# Patient Record
Sex: FEMALE | Race: WHITE | NOT HISPANIC OR LATINO | URBAN - METROPOLITAN AREA
[De-identification: names, ages, dates, MRNs, and addresses within clinical notes are randomized per-mention and may not be internally consistent; named-entity substitution may affect disease eponyms.]

---

## 2017-01-09 ENCOUNTER — EMERGENCY (EMERGENCY)
Facility: HOSPITAL | Age: 32
LOS: 0 days | Discharge: HOME | End: 2017-01-09
Admitting: EMERGENCY MEDICINE
Payer: MEDICARE

## 2017-01-09 DIAGNOSIS — B37.3 CANDIDIASIS OF VULVA AND VAGINA: ICD-10-CM

## 2017-01-09 DIAGNOSIS — J06.9 ACUTE UPPER RESPIRATORY INFECTION, UNSPECIFIED: ICD-10-CM

## 2017-01-09 DIAGNOSIS — J01.90 ACUTE SINUSITIS, UNSPECIFIED: ICD-10-CM

## 2017-01-09 PROCEDURE — 99284 EMERGENCY DEPT VISIT MOD MDM: CPT

## 2017-03-17 ENCOUNTER — RECORD ABSTRACTING (OUTPATIENT)
Age: 32
End: 2017-03-17

## 2017-03-17 DIAGNOSIS — Z82.49 FAMILY HISTORY OF ISCHEMIC HEART DISEASE AND OTHER DISEASES OF THE CIRCULATORY SYSTEM: ICD-10-CM

## 2017-03-17 DIAGNOSIS — Z78.9 OTHER SPECIFIED HEALTH STATUS: ICD-10-CM

## 2017-03-17 DIAGNOSIS — Z83.3 FAMILY HISTORY OF DIABETES MELLITUS: ICD-10-CM

## 2017-03-17 DIAGNOSIS — D30.3 BENIGN NEOPLASM OF BLADDER: ICD-10-CM

## 2017-03-17 RX ORDER — ALBUTEROL SULFATE 90 UG/1
108 (90 BASE) AEROSOL, METERED RESPIRATORY (INHALATION)
Refills: 0 | Status: ACTIVE | COMMUNITY

## 2017-04-03 ENCOUNTER — RX RENEWAL (OUTPATIENT)
Age: 32
End: 2017-04-03

## 2017-04-04 ENCOUNTER — APPOINTMENT (OUTPATIENT)
Dept: INTERNAL MEDICINE | Facility: CLINIC | Age: 32
End: 2017-04-04

## 2019-01-10 ENCOUNTER — APPOINTMENT (OUTPATIENT)
Dept: NEUROLOGY | Facility: CLINIC | Age: 34
End: 2019-01-10

## 2019-08-06 ENCOUNTER — TRANSCRIPTION ENCOUNTER (OUTPATIENT)
Age: 34
End: 2019-08-06

## 2019-10-28 ENCOUNTER — TRANSCRIPTION ENCOUNTER (OUTPATIENT)
Age: 34
End: 2019-10-28

## 2019-11-16 ENCOUNTER — EMERGENCY (EMERGENCY)
Facility: HOSPITAL | Age: 34
LOS: 0 days | Discharge: HOME | End: 2019-11-17
Attending: EMERGENCY MEDICINE
Payer: MEDICARE

## 2019-11-16 VITALS — HEIGHT: 57 IN | WEIGHT: 229.94 LBS

## 2019-11-16 DIAGNOSIS — R11.10 VOMITING, UNSPECIFIED: ICD-10-CM

## 2019-11-16 DIAGNOSIS — Z98.890 OTHER SPECIFIED POSTPROCEDURAL STATES: ICD-10-CM

## 2019-11-16 DIAGNOSIS — R11.2 NAUSEA WITH VOMITING, UNSPECIFIED: ICD-10-CM

## 2019-11-16 DIAGNOSIS — R50.9 FEVER, UNSPECIFIED: ICD-10-CM

## 2019-11-16 DIAGNOSIS — R19.7 DIARRHEA, UNSPECIFIED: ICD-10-CM

## 2019-11-16 DIAGNOSIS — N89.8 OTHER SPECIFIED NONINFLAMMATORY DISORDERS OF VAGINA: ICD-10-CM

## 2019-11-16 LAB
ALBUMIN SERPL ELPH-MCNC: 3.5 G/DL — SIGNIFICANT CHANGE UP (ref 3.3–5)
ALP SERPL-CCNC: 86 U/L — SIGNIFICANT CHANGE UP (ref 40–120)
ALT FLD-CCNC: 28 U/L — SIGNIFICANT CHANGE UP (ref 12–78)
ANION GAP SERPL CALC-SCNC: 8 MMOL/L — SIGNIFICANT CHANGE UP (ref 5–17)
APPEARANCE UR: ABNORMAL
AST SERPL-CCNC: 11 U/L — LOW (ref 15–37)
BASOPHILS # BLD AUTO: 0.04 K/UL — SIGNIFICANT CHANGE UP (ref 0–0.2)
BASOPHILS NFR BLD AUTO: 0.3 % — SIGNIFICANT CHANGE UP (ref 0–2)
BILIRUB SERPL-MCNC: 0.5 MG/DL — SIGNIFICANT CHANGE UP (ref 0.2–1.2)
BILIRUB UR-MCNC: NEGATIVE — SIGNIFICANT CHANGE UP
BUN SERPL-MCNC: 19 MG/DL — SIGNIFICANT CHANGE UP (ref 7–23)
CALCIUM SERPL-MCNC: 8.9 MG/DL — SIGNIFICANT CHANGE UP (ref 8.5–10.1)
CHLORIDE SERPL-SCNC: 103 MMOL/L — SIGNIFICANT CHANGE UP (ref 96–108)
CO2 SERPL-SCNC: 25 MMOL/L — SIGNIFICANT CHANGE UP (ref 22–31)
COLOR SPEC: YELLOW — SIGNIFICANT CHANGE UP
CREAT SERPL-MCNC: 0.75 MG/DL — SIGNIFICANT CHANGE UP (ref 0.5–1.3)
DIFF PNL FLD: ABNORMAL
EOSINOPHIL # BLD AUTO: 0.07 K/UL — SIGNIFICANT CHANGE UP (ref 0–0.5)
EOSINOPHIL NFR BLD AUTO: 0.5 % — SIGNIFICANT CHANGE UP (ref 0–6)
GLUCOSE SERPL-MCNC: 114 MG/DL — HIGH (ref 70–99)
GLUCOSE UR QL: NEGATIVE MG/DL — SIGNIFICANT CHANGE UP
HCT VFR BLD CALC: 38.5 % — SIGNIFICANT CHANGE UP (ref 34.5–45)
HGB BLD-MCNC: 12.5 G/DL — SIGNIFICANT CHANGE UP (ref 11.5–15.5)
IMM GRANULOCYTES NFR BLD AUTO: 0.5 % — SIGNIFICANT CHANGE UP (ref 0–1.5)
KETONES UR-MCNC: NEGATIVE — SIGNIFICANT CHANGE UP
LACTATE SERPL-SCNC: 3.6 MMOL/L — HIGH (ref 0.7–2)
LEUKOCYTE ESTERASE UR-ACNC: ABNORMAL
LYMPHOCYTES # BLD AUTO: 1.04 K/UL — SIGNIFICANT CHANGE UP (ref 1–3.3)
LYMPHOCYTES # BLD AUTO: 7.9 % — LOW (ref 13–44)
MAGNESIUM SERPL-MCNC: 1.8 MG/DL — SIGNIFICANT CHANGE UP (ref 1.6–2.6)
MCHC RBC-ENTMCNC: 29.7 PG — SIGNIFICANT CHANGE UP (ref 27–34)
MCHC RBC-ENTMCNC: 32.5 GM/DL — SIGNIFICANT CHANGE UP (ref 32–36)
MCV RBC AUTO: 91.4 FL — SIGNIFICANT CHANGE UP (ref 80–100)
MONOCYTES # BLD AUTO: 0.71 K/UL — SIGNIFICANT CHANGE UP (ref 0–0.9)
MONOCYTES NFR BLD AUTO: 5.4 % — SIGNIFICANT CHANGE UP (ref 2–14)
NEUTROPHILS # BLD AUTO: 11.32 K/UL — HIGH (ref 1.8–7.4)
NEUTROPHILS NFR BLD AUTO: 85.4 % — HIGH (ref 43–77)
NITRITE UR-MCNC: NEGATIVE — SIGNIFICANT CHANGE UP
PH UR: 6 — SIGNIFICANT CHANGE UP (ref 5–8)
PHOSPHATE SERPL-MCNC: 1.8 MG/DL — LOW (ref 2.5–4.5)
PLATELET # BLD AUTO: 223 K/UL — SIGNIFICANT CHANGE UP (ref 150–400)
POTASSIUM SERPL-MCNC: 3.7 MMOL/L — SIGNIFICANT CHANGE UP (ref 3.5–5.3)
POTASSIUM SERPL-SCNC: 3.7 MMOL/L — SIGNIFICANT CHANGE UP (ref 3.5–5.3)
PROT SERPL-MCNC: 7.6 GM/DL — SIGNIFICANT CHANGE UP (ref 6–8.3)
PROT UR-MCNC: 30 MG/DL
RBC # BLD: 4.21 M/UL — SIGNIFICANT CHANGE UP (ref 3.8–5.2)
RBC # FLD: 13.3 % — SIGNIFICANT CHANGE UP (ref 10.3–14.5)
SODIUM SERPL-SCNC: 136 MMOL/L — SIGNIFICANT CHANGE UP (ref 135–145)
SP GR SPEC: 1.02 — SIGNIFICANT CHANGE UP (ref 1.01–1.02)
UROBILINOGEN FLD QL: NEGATIVE MG/DL — SIGNIFICANT CHANGE UP
WBC # BLD: 13.24 K/UL — HIGH (ref 3.8–10.5)
WBC # FLD AUTO: 13.24 K/UL — HIGH (ref 3.8–10.5)

## 2019-11-16 PROCEDURE — 96361 HYDRATE IV INFUSION ADD-ON: CPT

## 2019-11-16 PROCEDURE — 99284 EMERGENCY DEPT VISIT MOD MDM: CPT

## 2019-11-16 PROCEDURE — 96375 TX/PRO/DX INJ NEW DRUG ADDON: CPT

## 2019-11-16 PROCEDURE — 76830 TRANSVAGINAL US NON-OB: CPT

## 2019-11-16 PROCEDURE — 80053 COMPREHEN METABOLIC PANEL: CPT

## 2019-11-16 PROCEDURE — 81001 URINALYSIS AUTO W/SCOPE: CPT

## 2019-11-16 PROCEDURE — 84100 ASSAY OF PHOSPHORUS: CPT

## 2019-11-16 PROCEDURE — 87040 BLOOD CULTURE FOR BACTERIA: CPT | Mod: 91

## 2019-11-16 PROCEDURE — 36415 COLL VENOUS BLD VENIPUNCTURE: CPT

## 2019-11-16 PROCEDURE — 85025 COMPLETE CBC W/AUTO DIFF WBC: CPT

## 2019-11-16 PROCEDURE — 99284 EMERGENCY DEPT VISIT MOD MDM: CPT | Mod: 25

## 2019-11-16 PROCEDURE — 76830 TRANSVAGINAL US NON-OB: CPT | Mod: 26

## 2019-11-16 PROCEDURE — 96365 THER/PROPH/DIAG IV INF INIT: CPT

## 2019-11-16 PROCEDURE — 87086 URINE CULTURE/COLONY COUNT: CPT

## 2019-11-16 PROCEDURE — 83735 ASSAY OF MAGNESIUM: CPT

## 2019-11-16 PROCEDURE — 83605 ASSAY OF LACTIC ACID: CPT

## 2019-11-16 RX ORDER — SODIUM CHLORIDE 9 MG/ML
3200 INJECTION INTRAMUSCULAR; INTRAVENOUS; SUBCUTANEOUS ONCE
Refills: 0 | Status: DISCONTINUED | OUTPATIENT
Start: 2019-11-16 | End: 2019-11-16

## 2019-11-16 RX ORDER — SODIUM CHLORIDE 9 MG/ML
1000 INJECTION INTRAMUSCULAR; INTRAVENOUS; SUBCUTANEOUS ONCE
Refills: 0 | Status: COMPLETED | OUTPATIENT
Start: 2019-11-16 | End: 2019-11-16

## 2019-11-16 RX ORDER — SODIUM CHLORIDE 9 MG/ML
2250 INJECTION INTRAMUSCULAR; INTRAVENOUS; SUBCUTANEOUS ONCE
Refills: 0 | Status: COMPLETED | OUTPATIENT
Start: 2019-11-16 | End: 2019-11-16

## 2019-11-16 RX ORDER — ONDANSETRON 8 MG/1
4 TABLET, FILM COATED ORAL ONCE
Refills: 0 | Status: DISCONTINUED | OUTPATIENT
Start: 2019-11-16 | End: 2019-11-16

## 2019-11-16 RX ORDER — ONDANSETRON 8 MG/1
4 TABLET, FILM COATED ORAL ONCE
Refills: 0 | Status: COMPLETED | OUTPATIENT
Start: 2019-11-16 | End: 2019-11-16

## 2019-11-16 RX ORDER — PIPERACILLIN AND TAZOBACTAM 4; .5 G/20ML; G/20ML
3.38 INJECTION, POWDER, LYOPHILIZED, FOR SOLUTION INTRAVENOUS ONCE
Refills: 0 | Status: COMPLETED | OUTPATIENT
Start: 2019-11-16 | End: 2019-11-16

## 2019-11-16 RX ADMIN — SODIUM CHLORIDE 1125 MILLILITER(S): 9 INJECTION INTRAMUSCULAR; INTRAVENOUS; SUBCUTANEOUS at 22:01

## 2019-11-16 RX ADMIN — SODIUM CHLORIDE 1000 MILLILITER(S): 9 INJECTION INTRAMUSCULAR; INTRAVENOUS; SUBCUTANEOUS at 19:44

## 2019-11-16 RX ADMIN — PIPERACILLIN AND TAZOBACTAM 200 GRAM(S): 4; .5 INJECTION, POWDER, LYOPHILIZED, FOR SOLUTION INTRAVENOUS at 20:38

## 2019-11-16 RX ADMIN — SODIUM CHLORIDE 1000 MILLILITER(S): 9 INJECTION INTRAMUSCULAR; INTRAVENOUS; SUBCUTANEOUS at 22:02

## 2019-11-16 RX ADMIN — PIPERACILLIN AND TAZOBACTAM 3.38 GRAM(S): 4; .5 INJECTION, POWDER, LYOPHILIZED, FOR SOLUTION INTRAVENOUS at 22:02

## 2019-11-16 RX ADMIN — ONDANSETRON 4 MILLIGRAM(S): 8 TABLET, FILM COATED ORAL at 19:44

## 2019-11-16 NOTE — ED STATDOCS - OBJECTIVE STATEMENT
34 y/o female with PMHx of intracranial hypertension, bipolar I disorder, depression, extrinsic asthma, HLD, migraine headache, and UTI presents to the ED c/o +nausea and +vomiting today. Reports she had GYN procedure yesterday, removed a tampon that was stuck for ?weeks. Endorses +chills. No fever. NKDA. PCP: Dr. True Shah. 32 y/o female with PMHx of intracranial hypertension, bipolar I disorder, depression, extrinsic asthma, HLD, migraine headache, and UTI presents to the ED c/o +nausea and +vomiting today. Reports she had GYN procedure yesterday, removed a tampon that was stuck for ?weeks. Endorses +chills, +diarrhea, +vaginal bleeding/discharge . No fever. NKDA. PCP: Dr. True Shah. 32 y/o female with PMHx of intracranial hypertension, bipolar I disorder, depression, extrinsic asthma, HLD, migraine headache, and UTI presents to the ED c/o +nausea and +vomiting today. Reports she had GYN procedure yesterday, removed a tampon that was stuck for possibly 2 weeks.  Patient states she had vaginal discharge at time of exam, and cultures sent by her gynecologist.  She states she had yellow vaginal discharge frequently, and this is not new to her.  In addition to vomiting, she endorses +chills, +diarrhea, +vaginal bleeding/discharge this morning. No fever. NKDA. PCP: Dr. True Shah.

## 2019-11-16 NOTE — ED ADULT NURSE NOTE - OBJECTIVE STATEMENT
Patient comes in with nausea, vomiting, diarrhea since gyn procedure yesterday in which they removed a tampon. Patient states shes unable to keep fluids or solids down since procedure. Denies chest pain, sob.

## 2019-11-16 NOTE — ED STATDOCS - PMH
Benign intracranial hypertension    Bipolar I disorder    Depression    Extrinsic asthma    Hypercholesterolemia with endogenous hyperglyceridemia    Migraine headache    UTI (urinary tract infection)

## 2019-11-16 NOTE — ED STATDOCS - PATIENT PORTAL LINK FT
You can access the FollowMyHealth Patient Portal offered by [facility] by registering at the following website: [email or web address]. By joining [facility]’s FollowMyHealth portal, you will also be able to view your health information using other applications (apps) compatible with our system.

## 2019-11-16 NOTE — ED STATDOCS - PROGRESS NOTE DETAILS
Patient is a 32 y/o female with PMHx of intracranial hypertension, bipolar I disorder, depression, extrinsic asthma, HLD, migraine headache, and UTI who presents to [facility] c/o n/v x 1 day. Patient had a approx 2-3 week old tampon removed yesterday by her OB GYN. Patient feels feverish and has chills. DENIES vaginal bleeding, fever. ~LULA Pringle Awaiting SONO results, OB consultation. Care transitioned to LULA Granados. ~LULA Pringle Sono results with large ovarian cyst (already known to patient and her OB/Gyn, received outpatient MRI already), no torsion, no tubo-ovarian abscess.  Pelvic exam performed, scant yellow discharge, no bleeding, no cervical lesions, no retained products, no tenderness to cervix or adnexa.  Repeat urine collected and to be sent, repeat lactic acid to be drawn.  Newton Finch D.O.

## 2019-11-16 NOTE — ED ADULT TRIAGE NOTE - CHIEF COMPLAINT QUOTE
Pt s/p GYN procedure yesterday. Pt states that today she has nausea, vomiting and feels feverish (pt states that she does not have a thermometer at home).

## 2019-11-16 NOTE — ED STATDOCS - CLINICAL SUMMARY MEDICAL DECISION MAKING FREE TEXT BOX
Patient with CC of n/v/d on arrival.  Recent tampon removal yesterday.  Patient without hypotension, no rash thus does not fit toxic shock syndrome.  Pelvic exam with scan yellow vaginal discharge, however NO pelvic tenderness, which does not fit PID.  Also no tubo-ovarian abscess on US.  Large ovarian cyst, already known to patient and her OB/Gyn, and receiving outpatient follow up for this.  No signs of torsion on US.  Initial UA was contaminated.  Repeat UA demonstrates.  Pt with elevated Lactic acid and WBC, going along with infection/dehydration.  Repeat lactic acid drawn. Patient with CC of n/v/d on arrival.  Recent tampon removal yesterday.  Patient without hypotension, no rash thus does not fit toxic shock syndrome.  Pelvic exam with scan yellow vaginal discharge, however NO pelvic tenderness, which does not fit PID.  Also no tubo-ovarian abscess on US.  Large ovarian cyst, already known to patient and her OB/Gyn, and receiving outpatient follow up for this.  No signs of torsion on US.  Initial UA was contaminated.  Repeat UA normal.  Pt with elevated Lactic acid and WBC, going along with infection/dehydration.  Repeat lactic acid drawn. Patient with CC of n/v/d on arrival.  Recent tampon removal yesterday.  Patient without hypotension, no rash thus does not fit toxic shock syndrome.  Pelvic exam with scan yellow vaginal discharge, however NO pelvic tenderness, which does not fit PID.  Also no tubo-ovarian abscess on US.  Large ovarian cyst, already known to patient and her OB/Gyn, and receiving outpatient follow up for this.  No signs of torsion on US.  Initial UA was contaminated.  Repeat UA normal.  Pt with elevated Lactic acid and WBC, going along with infection/dehydration.  Repeat lactic acid drawn which was normal.  Low phosphorus, repleted in ED.  Pt feeling much improved, wants to go home.  Discussed treatment options of her vaginal discharge in ED, states gynecologist already took cultures, but is open to treating with antibiotics in light of her presentation tonight, and follow up with gynecology for this.  Also sending Zofran to pharmacy for n/v.  Strict return precautions given.

## 2019-11-16 NOTE — ED STATDOCS - PHYSICAL EXAMINATION
GEN: AOX3, NAD. HEENT: Throat clear. Airway is patent. EYES: PERRLA. EOMI. Head: NC/AT. NECK: Supple, No JVD. FROM. C-spine non-tender. CV:S1S2, RRR, LUNGS: CTA b/l, no w/r/r. CHEST: Equal chest expansion and rise. No deformity. ABD: Soft, NT/ND, no rebound, no guarding. No CVAT. EXT: No e/c/c. 2+ distal pulses. SKIN: No rashes. NEURO: No focal deficits. CN II-XII intact. FROM. 5/5 motor and sensory. LULA Pringle

## 2019-11-17 VITALS
HEART RATE: 100 BPM | RESPIRATION RATE: 17 BRPM | OXYGEN SATURATION: 100 % | TEMPERATURE: 100 F | DIASTOLIC BLOOD PRESSURE: 61 MMHG | SYSTOLIC BLOOD PRESSURE: 114 MMHG

## 2019-11-17 LAB
APPEARANCE UR: CLEAR — SIGNIFICANT CHANGE UP
BILIRUB UR-MCNC: NEGATIVE — SIGNIFICANT CHANGE UP
COLOR SPEC: YELLOW — SIGNIFICANT CHANGE UP
CULTURE RESULTS: SIGNIFICANT CHANGE UP
DIFF PNL FLD: ABNORMAL
GLUCOSE UR QL: NEGATIVE MG/DL — SIGNIFICANT CHANGE UP
KETONES UR-MCNC: NEGATIVE — SIGNIFICANT CHANGE UP
LEUKOCYTE ESTERASE UR-ACNC: ABNORMAL
NITRITE UR-MCNC: NEGATIVE — SIGNIFICANT CHANGE UP
PH UR: 6 — SIGNIFICANT CHANGE UP (ref 5–8)
PROT UR-MCNC: NEGATIVE MG/DL — SIGNIFICANT CHANGE UP
SP GR SPEC: 1.01 — SIGNIFICANT CHANGE UP (ref 1.01–1.02)
SPECIMEN SOURCE: SIGNIFICANT CHANGE UP
UROBILINOGEN FLD QL: NEGATIVE MG/DL — SIGNIFICANT CHANGE UP

## 2019-11-17 RX ORDER — METRONIDAZOLE 500 MG
1 TABLET ORAL
Qty: 14 | Refills: 0
Start: 2019-11-17 | End: 2019-11-23

## 2019-11-17 RX ORDER — ONDANSETRON 8 MG/1
1 TABLET, FILM COATED ORAL
Qty: 28 | Refills: 0
Start: 2019-11-17 | End: 2019-11-23

## 2019-11-17 RX ORDER — SODIUM,POTASSIUM PHOSPHATES 278-250MG
2 POWDER IN PACKET (EA) ORAL ONCE
Refills: 0 | Status: COMPLETED | OUTPATIENT
Start: 2019-11-17 | End: 2019-11-17

## 2019-11-17 RX ADMIN — Medication 2 PACKET(S): at 01:28

## 2019-11-18 LAB
CULTURE RESULTS: NO GROWTH — SIGNIFICANT CHANGE UP
SPECIMEN SOURCE: SIGNIFICANT CHANGE UP

## 2019-11-22 LAB
CULTURE RESULTS: SIGNIFICANT CHANGE UP
CULTURE RESULTS: SIGNIFICANT CHANGE UP
SPECIMEN SOURCE: SIGNIFICANT CHANGE UP
SPECIMEN SOURCE: SIGNIFICANT CHANGE UP

## 2019-12-27 PROBLEM — N39.0 URINARY TRACT INFECTION, SITE NOT SPECIFIED: Chronic | Status: ACTIVE | Noted: 2019-11-17

## 2019-12-27 PROBLEM — F31.9 BIPOLAR DISORDER, UNSPECIFIED: Chronic | Status: ACTIVE | Noted: 2019-11-17

## 2019-12-27 PROBLEM — G43.909 MIGRAINE, UNSPECIFIED, NOT INTRACTABLE, WITHOUT STATUS MIGRAINOSUS: Chronic | Status: ACTIVE | Noted: 2019-11-17

## 2019-12-27 PROBLEM — J45.909 UNSPECIFIED ASTHMA, UNCOMPLICATED: Chronic | Status: ACTIVE | Noted: 2019-11-17

## 2019-12-27 PROBLEM — F32.9 MAJOR DEPRESSIVE DISORDER, SINGLE EPISODE, UNSPECIFIED: Chronic | Status: ACTIVE | Noted: 2019-11-17

## 2019-12-27 PROBLEM — G93.2 BENIGN INTRACRANIAL HYPERTENSION: Chronic | Status: ACTIVE | Noted: 2019-11-17

## 2020-01-13 ENCOUNTER — APPOINTMENT (OUTPATIENT)
Dept: OBGYN | Facility: CLINIC | Age: 35
End: 2020-01-13

## 2020-02-15 ENCOUNTER — TRANSCRIPTION ENCOUNTER (OUTPATIENT)
Age: 35
End: 2020-02-15

## 2020-02-25 ENCOUNTER — APPOINTMENT (OUTPATIENT)
Dept: OBGYN | Facility: CLINIC | Age: 35
End: 2020-02-25

## 2020-05-14 ENCOUNTER — TRANSCRIPTION ENCOUNTER (OUTPATIENT)
Age: 35
End: 2020-05-14

## 2020-06-16 ENCOUNTER — APPOINTMENT (OUTPATIENT)
Dept: NEUROLOGY | Facility: CLINIC | Age: 35
End: 2020-06-16

## 2020-06-24 ENCOUNTER — APPOINTMENT (OUTPATIENT)
Dept: INTERNAL MEDICINE | Facility: CLINIC | Age: 35
End: 2020-06-24
Payer: MEDICARE

## 2020-06-24 VITALS
HEIGHT: 57 IN | SYSTOLIC BLOOD PRESSURE: 114 MMHG | OXYGEN SATURATION: 99 % | HEART RATE: 110 BPM | BODY MASS INDEX: 54.58 KG/M2 | TEMPERATURE: 98.7 F | DIASTOLIC BLOOD PRESSURE: 78 MMHG | WEIGHT: 253 LBS | RESPIRATION RATE: 16 BRPM

## 2020-06-24 PROCEDURE — 99204 OFFICE O/P NEW MOD 45 MIN: CPT

## 2020-06-24 RX ORDER — DIVALPROEX SODIUM 500 MG/1
500 TABLET, DELAYED RELEASE ORAL TWICE DAILY
Refills: 0 | Status: DISCONTINUED | COMMUNITY
End: 2020-06-24

## 2020-06-24 RX ORDER — FLUTICASONE PROPIONATE 50 UG/1
50 SPRAY, METERED NASAL
Refills: 0 | Status: ACTIVE | COMMUNITY

## 2020-06-24 RX ORDER — CELECOXIB 200 MG/1
200 CAPSULE ORAL TWICE DAILY
Refills: 0 | Status: DISCONTINUED | COMMUNITY
End: 2020-06-24

## 2020-06-24 RX ORDER — ACETAZOLAMIDE 250 MG/1
250 TABLET ORAL TWICE DAILY
Refills: 0 | Status: DISCONTINUED | COMMUNITY
End: 2020-06-24

## 2020-06-24 RX ORDER — FLUVOXAMINE MALEATE 50 MG/1
50 TABLET ORAL
Qty: 60 | Refills: 0 | Status: DISCONTINUED | COMMUNITY
Start: 2017-04-03 | End: 2020-06-24

## 2020-06-24 RX ORDER — SERTRALINE HYDROCHLORIDE 100 MG/1
100 TABLET, FILM COATED ORAL TWICE DAILY
Refills: 0 | Status: DISCONTINUED | COMMUNITY
End: 2020-06-24

## 2020-06-24 RX ORDER — SUMATRIPTAN 50 MG/1
TABLET, FILM COATED ORAL
Refills: 0 | Status: DISCONTINUED | COMMUNITY
End: 2020-06-24

## 2020-06-24 RX ORDER — LUBIPROSTONE 24 UG/1
24 CAPSULE, GELATIN COATED ORAL TWICE DAILY
Refills: 0 | Status: DISCONTINUED | COMMUNITY
End: 2020-06-24

## 2020-06-24 RX ORDER — ALBUTEROL SULFATE 2.5 MG/3ML
(2.5 MG/3ML) SOLUTION RESPIRATORY (INHALATION) EVERY 6 HOURS
Refills: 0 | Status: DISCONTINUED | COMMUNITY
End: 2020-06-24

## 2020-06-24 RX ORDER — AZELASTINE HYDROCHLORIDE 137 UG/1
137 SPRAY, METERED NASAL
Refills: 0 | Status: ACTIVE | COMMUNITY

## 2020-06-24 RX ORDER — CYCLOBENZAPRINE HYDROCHLORIDE 10 MG/1
10 TABLET, FILM COATED ORAL 3 TIMES DAILY
Qty: 90 | Refills: 0 | Status: DISCONTINUED | COMMUNITY
Start: 2017-04-03 | End: 2020-06-24

## 2020-06-24 RX ORDER — FLUVOXAMINE MALEATE 100 MG/1
100 TABLET, FILM COATED ORAL
Qty: 60 | Refills: 0 | Status: DISCONTINUED | COMMUNITY
Start: 2017-04-03 | End: 2020-06-24

## 2020-06-24 NOTE — HEALTH RISK ASSESSMENT
[Good] : ~his/her~  mood as  good [No] : No [1] : 1) Little interest or pleasure doing things for several days (1) [Patient reported colonoscopy was normal] : Patient reported colonoscopy was normal [Smoke Detector] : smoke detector [On disability] : on disability [Carbon Monoxide Detector] : carbon monoxide detector [Seat Belt] :  uses seat belt [Sunscreen] : uses sunscreen [] : No [[id number]] : 2 [Guns at Home] : no guns at home [ColonoscopyDate] : 01/2018

## 2020-06-24 NOTE — HISTORY OF PRESENT ILLNESS
[FreeTextEntry1] : Patient comes in to establish care.\par  [de-identified] : Patient is a 34-year-old female with a history of anxiety and depression, panic attacks, OCD, IBS with constipation, hyperlipidemia, GERD, back pain with sciatica, migraine headaches, intracranial hypertension, asthma comes in to establish care. Previously was followed with Dr. Sweta Wasserman for her primary care. Currently sees  GI, Dr.Donna Pichardo GYN, Dr.Hourizideh Diaz,  NeuroOptho, Psych Dr.Bizwada,  Rheumatology. Patient states she has a long history of back pain with degenerative disc disease for which he is on disability for her. [place] and will need another disability form filled out. She is also on disability 2 history of multiple psychiatric disorders. She states she is unable to lose weight or control her diet and she craves sugar and is unable to stop drinking sodas, coffee with sugar, and has an uncontrolled diet. Her cholesterol continues to remain high and her previous PCP was checking every 3 months. She is compliant with her Lipitor 20 mg. Her psychiatrist and therapist have recommended losing weight but she has been unable to. She is also been referred to plastic surgeon for breast reduction however insurance states she will need to try and fail conventional methods of weight loss prior to being able to have a breast reduction.\par Patient denies any cp, sob,abdominal pain, nausea, vomiting, palpitations, fever, chills, constipation, diarrhea.\par

## 2020-06-24 NOTE — HISTORY OF PRESENT ILLNESS
[FreeTextEntry1] : Patient comes in to establish care.\par  [de-identified] : Patient is a 34-year-old female with a history of anxiety and depression, panic attacks, OCD, IBS with constipation, hyperlipidemia, GERD, back pain with sciatica, migraine headaches, intracranial hypertension, asthma comes in to establish care. Previously was followed with Dr. Sweta Wasserman for her primary care. Currently sees  GI, Dr.Donna Pichardo GYN, Dr.Hourizideh Diaz,  NeuroOptho, Psych Dr.Bizwada,  Rheumatology. Patient states she has a long history of back pain with degenerative disc disease for which he is on disability for her. [place] and will need another disability form filled out. She is also on disability 2 history of multiple psychiatric disorders. She states she is unable to lose weight or control her diet and she craves sugar and is unable to stop drinking sodas, coffee with sugar, and has an uncontrolled diet. Her cholesterol continues to remain high and her previous PCP was checking every 3 months. She is compliant with her Lipitor 20 mg. Her psychiatrist and therapist have recommended losing weight but she has been unable to. She is also been referred to plastic surgeon for breast reduction however insurance states she will need to try and fail conventional methods of weight loss prior to being able to have a breast reduction.\par Patient denies any cp, sob,abdominal pain, nausea, vomiting, palpitations, fever, chills, constipation, diarrhea.\par

## 2020-06-24 NOTE — ASSESSMENT
[FreeTextEntry1] : 1.hyperlipidemia: Recheck nonfasting lipids, discussed trying low-cholesterol diet, continue on Lipitor 20 mg daily. Follow up in 6 months\par \par 2.depression/OCD/anxiety with panic attacks: Continue followup with psychiatrist and therapist, continue on Lexapro 10 mg, Prozac 40 mg, Ativan 1 mg and Risperdal 2 mg\par \par 3.asthma: Continued well the pulmonologist, continue on Ventolin and Advair.\par \par 4.GERD with IBS: Continue on Linzess  and Protonix, follow up with GI.\par \par 5.benign intracranial hypertension: Follow up with neuro-ophthalmology.

## 2020-06-24 NOTE — HEALTH RISK ASSESSMENT
[Good] : ~his/her~  mood as  good [No] : In the past 12 months have you used drugs other than those required for medical reasons? No [1] : 1) Little interest or pleasure doing things for several days (1) [Patient reported colonoscopy was normal] : Patient reported colonoscopy was normal [On disability] : on disability [Smoke Detector] : smoke detector [Carbon Monoxide Detector] : carbon monoxide detector [Seat Belt] :  uses seat belt [Sunscreen] : uses sunscreen [[id number]] : 2 [] : No [ColonoscopyDate] : 01/2018 [Guns at Home] : no guns at home

## 2020-06-24 NOTE — PAST MEDICAL HISTORY
[Menarche Age ____] : age at menarche was [unfilled] [Irregular Cycle Intervals] : are  irregular [Definite ___ (Date)] : the last menstrual period was [unfilled]

## 2020-06-29 LAB
ALBUMIN SERPL ELPH-MCNC: 4.4 G/DL
ALP BLD-CCNC: 76 U/L
ALT SERPL-CCNC: 15 U/L
ANION GAP SERPL CALC-SCNC: 13 MMOL/L
AST SERPL-CCNC: 14 U/L
BASOPHILS # BLD AUTO: 0.07 K/UL
BASOPHILS NFR BLD AUTO: 0.7 %
BILIRUB SERPL-MCNC: 0.3 MG/DL
BUN SERPL-MCNC: 9 MG/DL
CALCIUM SERPL-MCNC: 9.4 MG/DL
CHLORIDE SERPL-SCNC: 100 MMOL/L
CHOLEST SERPL-MCNC: 221 MG/DL
CHOLEST/HDLC SERPL: 5.1 RATIO
CO2 SERPL-SCNC: 26 MMOL/L
CREAT SERPL-MCNC: 0.54 MG/DL
EOSINOPHIL # BLD AUTO: 0.08 K/UL
EOSINOPHIL NFR BLD AUTO: 0.8 %
ESTIMATED AVERAGE GLUCOSE: 108 MG/DL
GLUCOSE SERPL-MCNC: 97 MG/DL
HBA1C MFR BLD HPLC: 5.4 %
HCT VFR BLD CALC: 38.1 %
HDLC SERPL-MCNC: 43 MG/DL
HGB BLD-MCNC: 12 G/DL
IMM GRANULOCYTES NFR BLD AUTO: 0.5 %
LDLC SERPL CALC-MCNC: 155 MG/DL
LYMPHOCYTES # BLD AUTO: 2.58 K/UL
LYMPHOCYTES NFR BLD AUTO: 26.5 %
MAN DIFF?: NORMAL
MCHC RBC-ENTMCNC: 29.1 PG
MCHC RBC-ENTMCNC: 31.5 GM/DL
MCV RBC AUTO: 92.5 FL
MONOCYTES # BLD AUTO: 0.59 K/UL
MONOCYTES NFR BLD AUTO: 6.1 %
NEUTROPHILS # BLD AUTO: 6.37 K/UL
NEUTROPHILS NFR BLD AUTO: 65.4 %
PLATELET # BLD AUTO: 252 K/UL
POTASSIUM SERPL-SCNC: 3.9 MMOL/L
PROT SERPL-MCNC: 7.2 G/DL
RBC # BLD: 4.12 M/UL
RBC # FLD: 13.8 %
SODIUM SERPL-SCNC: 138 MMOL/L
TRIGL SERPL-MCNC: 112 MG/DL
TSH SERPL-ACNC: 1.96 UIU/ML
WBC # FLD AUTO: 9.74 K/UL

## 2020-07-22 ENCOUNTER — TRANSCRIPTION ENCOUNTER (OUTPATIENT)
Age: 35
End: 2020-07-22

## 2020-08-03 ENCOUNTER — APPOINTMENT (OUTPATIENT)
Dept: ORTHOPEDIC SURGERY | Facility: CLINIC | Age: 35
End: 2020-08-03
Payer: MEDICARE

## 2020-08-03 DIAGNOSIS — S69.82XD OTHER SPECIFIED INJURIES OF LEFT WRIST, HAND AND FINGER(S), SUBSEQUENT ENCOUNTER: ICD-10-CM

## 2020-08-03 PROCEDURE — 99204 OFFICE O/P NEW MOD 45 MIN: CPT

## 2020-08-03 NOTE — PHYSICAL EXAM
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 No ecchymosis. No swelling. No palpable joint effusion. No palpable deformity. No crepitus\par ROM: RIGHT full flexion and extension, full supination/pronation.  LEFT the flexion and extension are intact but limited secondary to pain, supination/pronation are intact but limited secondary to pain.\par Painful ROM: None\par Tenderness: No tenderness to palpation at the distal radius, distal ulna, the DRUJ. No pain at the scapholunate interval. No snuffbox pain. Positive pain at the ulnar fovea.\par Strength: 5/5 wrist flexion, 5/5 wrist extension, 5/5 supination, 5/5 pronation\par Stability: Stable DRUJ \par Vasc: 2+ radial pulse, <2s cap refill\par Sensation: In tact to light touch throughout\par Neuro: Negative tinels over median nerve, AIN/PIN/Ulnar nerve in tact to motor/sensation.\par  [de-identified] : 3 x-ray views of the left wrist were reviewed from an outside institution. There are no fractures or dislocations noted. There is mild sclerosis noted consistent with possible Keinbock's with ulnar negative variance.

## 2020-08-03 NOTE — HISTORY OF PRESENT ILLNESS
[FreeTextEntry1] : 08/03/2020: Patient is a 34 year-old, right-hand-dominant female who presents to the office today with left ulnar-sided wrist pain.  She notes that about 2 weeks ago she had closed a shower door on her left wrist. She felt pain right away and was seen at urgent care. No fractures were noted. Since then she has been wearing a wrist immobilizer. She has had difficulty opening jars or performing twisting motions. She has taken Tylenol or Aleve with no improvement. She denies paresthesias.

## 2020-08-03 NOTE — ASSESSMENT
[FreeTextEntry1] : 34-year-old female with left ulnar sided wrist pain following an injury. Symptoms are consistent with a sprain of the TFCC, I recommend continued conservative treatment. She will take a 2 week course of anti-inflammatory medication and continue to use the wrist immobilizer. Follow up in 2 weeks for reevaluation. The nature of Kienboch's disease were discussed at length with the patient I recommend observation at this time since she remains asymptomatic.

## 2020-08-03 NOTE — PHYSICAL EXAM
[Normal Finger/nose] : finger to nose coordination [Normal] : no peripheral adenopathy appreciated [de-identified] : Left wrist exam\par \par Skin: Clean, dry, intact. No ecchymosis. No swelling. No palpable joint effusion. No palpable deformity. No crepitus\par ROM: RIGHT full flexion and extension, full supination/pronation.  LEFT the flexion and extension are intact but limited secondary to pain, supination/pronation are intact but limited secondary to pain.\par Painful ROM: None\par Tenderness: No tenderness to palpation at the distal radius, distal ulna, the DRUJ. No pain at the scapholunate interval. No snuffbox pain. Positive pain at the ulnar fovea.\par Strength: 5/5 wrist flexion, 5/5 wrist extension, 5/5 supination, 5/5 pronation\par Stability: Stable DRUJ \par Vasc: 2+ radial pulse, <2s cap refill\par Sensation: In tact to light touch throughout\par Neuro: Negative tinels over median nerve, AIN/PIN/Ulnar nerve in tact to motor/sensation.\par  [de-identified] : MAYOR [de-identified] : 3 x-ray views of the left wrist were reviewed from an outside institution. There are no fractures or dislocations noted. There is mild sclerosis noted consistent with possible Keinbock's with ulnar negative variance.

## 2020-08-07 ENCOUNTER — APPOINTMENT (OUTPATIENT)
Dept: NEUROLOGY | Facility: CLINIC | Age: 35
End: 2020-08-07
Payer: MEDICARE

## 2020-08-07 VITALS
DIASTOLIC BLOOD PRESSURE: 70 MMHG | BODY MASS INDEX: 55.02 KG/M2 | HEIGHT: 57 IN | SYSTOLIC BLOOD PRESSURE: 110 MMHG | TEMPERATURE: 97.9 F | HEART RATE: 109 BPM | WEIGHT: 255 LBS

## 2020-08-07 DIAGNOSIS — R42 DIZZINESS AND GIDDINESS: ICD-10-CM

## 2020-08-07 PROCEDURE — 99204 OFFICE O/P NEW MOD 45 MIN: CPT

## 2020-08-07 NOTE — HISTORY OF PRESENT ILLNESS
[FreeTextEntry1] : \par 4-year-old woman with a history of benign intracranial hypertension, chronic headaches, chronic back pain, fibromyalgia, mood disorder. [employer], complains of almost daily headaches, described as bilateral, throbbing, moderate to severe, immediately on awakening, or when she was asleep. This to worsen with activity or stress, can be so severe she could has to lay down. Associated with lightheadedness, light and sensitivity, blurred vision. Headaches can last 2-4 hours, sometimes more.Occasionally nauseous and rarely vomits. Takes Imitrex 25 mg, which helps. Also uses Excedrin, and Tylenol with some relief. As try preventative therapy the past, topiramate, presently O. 75 mg a day.\par Has been complaining of increasing frequency, severity. Now, more recently, complaining of intermittent vertigo, lightheadedness, steady gait, usually worsened with body movement, and activity. No change in hearing, no ringing in the has, no ear pain or pressure.\par History of chronic back pain, recent bleed. Reports had an MRI of the thoracic spine, clavicle last vertebra T7. Complains of no pain in both legs, sometimes radiated down the thighs, tingling, and numbness about the leg. No change in bowel or bladder habits.No history of malignancy, no diabetes.

## 2020-08-07 NOTE — PHYSICAL EXAM
[General Appearance - Alert] : alert [General Appearance - In No Acute Distress] : in no acute distress [Oriented To Time, Place, And Person] : oriented to person, place, and time [Impaired Insight] : insight and judgment were intact [Affect] : the affect was normal [Place] : oriented to place [Person] : oriented to person [Concentration Intact] : normal concentrating ability [Visual Intact] : visual attention was ~T not ~L decreased [Time] : oriented to time [Naming Objects] : no difficulty naming common objects [Repeating Phrases] : no difficulty repeating a phrase [Fluency] : fluency intact [Comprehension] : comprehension intact [Writing A Sentence] : no difficulty writing a sentence [Past History] : adequate knowledge of personal past history [Reading] : reading intact [Cranial Nerves Optic (II)] : visual acuity intact bilaterally,  visual fields full to confrontation, pupils equal round and reactive to light [Cranial Nerves Vestibulocochlear (VIII)] : hearing was intact bilaterally [Cranial Nerves Oculomotor (III)] : extraocular motion intact [Cranial Nerves Facial (VII)] : face symmetrical [Cranial Nerves Trigeminal (V)] : facial sensation intact symmetrically [Cranial Nerves Accessory (XI - Cranial And Spinal)] : head turning and shoulder shrug symmetric [Cranial Nerves Glossopharyngeal (IX)] : tongue and palate midline [No Muscle Atrophy] : normal bulk in all four extremities [Motor Tone] : muscle tone was normal in all four extremities [Cranial Nerves Hypoglossal (XII)] : there was no tongue deviation with protrusion [Motor Strength Upper Extremities Bilaterally] : strength was normal in both upper extremities [Paresis Pronator Drift Left-Sided] : no pronator drift on the left [Paresis Pronator Drift Right-Sided] : no pronator drift on the right [Motor Handedness Right-Handed] : the patient is right hand dominant [Motor Strength Upper Extremities Left] : strength was normal in the left upper extremity [Motor Strength Upper Extremities Right] : strength was normal in the right upper extremity [Motor Strength Lower Extremities Bilaterally] : there was weakness in both lower extremities [Sensation Pain / Temperature Decrease] : pain and temperature was intact [Motor Strength Lower Extremities Left] : there was weakness of the left lower extremity [Motor Strength Lower Extremities Right] : there was weakness of the right lower extremity [Sensation Tactile Decrease] : light touch was intact [Proprioception] : proprioception was intact [Romberg's Sign] : Romberg's sign was negtive [Abnormal Walk] : normal gait [Balance] : balance was intact [Tremor] : no tremor present [Past-pointing] : there was no past-pointing [Dysdiadochokinesia Bilaterally] : not present [Coordination - Dysmetria Impaired Finger-to-Nose Bilateral] : not present [2+] : Ankle jerk right 2+ [Plantar Reflex Right Only] : normal on the right [Plantar Reflex Left Only] : normal on the left [Sclera] : the sclera and conjunctiva were normal [PERRL With Normal Accommodation] : pupils were equal in size, round, reactive to light, with normal accommodation [Extraocular Movements] : extraocular movements were intact [Optic Disc Abnormality] : the optic disc were normal in size and color [Full Visual Field] : full visual field [Outer Ear] : the ears and nose were normal in appearance [Hearing Threshold Finger Rub Not [place]] : hearing was normal [Neck Appearance] : the appearance of the neck was normal [] : no respiratory distress [Neck Cervical Mass (___cm)] : no neck mass was observed [Respiration, Rhythm And Depth] : normal respiratory rhythm and effort [Arterial Pulses Carotid] : carotid pulses were normal with no bruits [FreeTextEntry1] : diffuse tenderness of the spine from midthoracic to lower lumbar [Edema] : there was no peripheral edema [Full Pulse] : the pedal pulses are present

## 2020-08-07 NOTE — REVIEW OF SYSTEMS
[Feeling Tired] : feeling tired [Numbness] : numbness [Tingling] : tingling [Difficulty Walking] : difficulty walking [Tension Headache] : tension-type headaches [Migraine Headache] : migraine headaches [Abnormal Sensation] : an abnormal sensation [Sleep Disturbances] : sleep disturbances [Depression] : depression [Anxiety] : anxiety [As Noted in HPI] : as noted in HPI [Joint Stiffness] : joint stiffness [Limb Pain] : limb pain [Negative] : Endocrine [FreeTextEntry9] : Chronic back pain

## 2020-08-07 NOTE — DISCUSSION/SUMMARY
[FreeTextEntry1] : 34-year-old woman with a history of chronic daily headaches, diagnosed in the past with intracranial hypotension, due to the topiramate. No responses for the headaches, more recently, complaining of persistent vertigo. Lower back pain, numbness and tingling in the extremities.\par Like to rule out any intercurrent pathology, and other disorder. Rule out MS, rule out peripheral neuropathy.\par Plan: MRI of brain with and without gadolinium.\par electroencephalograph.\par Electromyography of the lower extremities.\par Referral to ENT\par  Trial with Ajovy 225 mg SQ RUE, After obtaining verbal Consent.\par Imitrex 100 mg po PRN, can repeat in 2 hours

## 2020-08-24 RX ORDER — FREMANEZUMAB-VFRM 225 MG/1.5ML
225 INJECTION SUBCUTANEOUS
Qty: 1 | Refills: 11 | Status: DISCONTINUED | COMMUNITY
Start: 2020-08-20 | End: 2020-08-24

## 2020-09-01 ENCOUNTER — APPOINTMENT (OUTPATIENT)
Dept: ORTHOPEDIC SURGERY | Facility: CLINIC | Age: 35
End: 2020-09-01

## 2020-09-11 ENCOUNTER — APPOINTMENT (OUTPATIENT)
Dept: INTERNAL MEDICINE | Facility: CLINIC | Age: 35
End: 2020-09-11
Payer: MEDICARE

## 2020-09-11 VITALS
DIASTOLIC BLOOD PRESSURE: 50 MMHG | SYSTOLIC BLOOD PRESSURE: 118 MMHG | RESPIRATION RATE: 16 BRPM | BODY MASS INDEX: 56.74 KG/M2 | OXYGEN SATURATION: 99 % | WEIGHT: 263 LBS | HEART RATE: 90 BPM | TEMPERATURE: 98.8 F | HEIGHT: 57 IN

## 2020-09-11 DIAGNOSIS — D23.5 OTHER BENIGN NEOPLASM OF SKIN OF TRUNK: ICD-10-CM

## 2020-09-11 PROCEDURE — 99214 OFFICE O/P EST MOD 30 MIN: CPT

## 2020-09-11 NOTE — HISTORY OF PRESENT ILLNESS
[FreeTextEntry8] : Ms. TERRI VALLECILLO is a 34 year F who comes in for an acute visit.\par Patient is a 34-year-old female with history of anxiety and depression, OCD, hyperlipidemia, asthma comes in to discuss her upcoming bariatric surgery. She is seeing  who told her that she would need to see her primary care physician for clearance, cardiology, pulmonary, GI and psychology and nutritionist. Most of the nutritionist she is spoken to do not take her insurance and she is having a hard time finding a psychologist for the same reason. She's currently seeing psychiatrist Ana Maria Sanchez and  for therapy. She has a stress test with her cardiologist today Dr. Stefan Miles. She also has been seeing her dermatologist recently Dr. Lizbeth Moreno for a right breast possible dermatofibroma. Recently the dermatofibroma lesion on her right breast started to leak and therefore she was referred to a surgeon at [facility] for extension. However, they do not take her insurance and she needs a breast surgeon as well.\par I discussed with the patient up for her bariatric surgery she would likely need all of her other subspecialty clearance as and to have her medical clearance and within 30 days prior to her procedure and that I would need to have a letter from bariatric surgeon regarding lab work and EKG is needed prior to her procedure or presurgical testing.\par I also did let her know that if there is no physician within [facility] that she would need to contact her insurance plans.

## 2020-09-11 NOTE — ASSESSMENT
[FreeTextEntry1] : 1.morbid obesity: Advised patient that she would need to follow up with her other consultants including cardiology, pulmonary, GI and psychology and nutritionist for her upcoming bariatric surgery and to see me 30 days prior to her procedure with presurgical testing information from surgeon, .\par Patient given [facility] information for referrals needed including psychology, nutritionist and breast surgeon. She understands that they did not take her insurance that she will need to be in touch with her insurance company.\par \par 2.dermatofibroma of the right breast: Given referral to breast surgeon for possible excision. Follow up with dermatology.\par \par

## 2020-09-18 ENCOUNTER — APPOINTMENT (OUTPATIENT)
Dept: NEUROLOGY | Facility: CLINIC | Age: 35
End: 2020-09-18

## 2020-10-05 ENCOUNTER — APPOINTMENT (OUTPATIENT)
Dept: NEUROLOGY | Facility: CLINIC | Age: 35
End: 2020-10-05

## 2020-10-07 ENCOUNTER — APPOINTMENT (OUTPATIENT)
Dept: NEUROLOGY | Facility: CLINIC | Age: 35
End: 2020-10-07

## 2020-11-06 ENCOUNTER — APPOINTMENT (OUTPATIENT)
Dept: DERMATOLOGY | Facility: CLINIC | Age: 35
End: 2020-11-06

## 2020-12-07 ENCOUNTER — APPOINTMENT (OUTPATIENT)
Dept: NEUROLOGY | Facility: CLINIC | Age: 35
End: 2020-12-07
Payer: MEDICARE

## 2020-12-07 VITALS
WEIGHT: 263 LBS | BODY MASS INDEX: 56.74 KG/M2 | HEIGHT: 57 IN | HEART RATE: 90 BPM | SYSTOLIC BLOOD PRESSURE: 118 MMHG | DIASTOLIC BLOOD PRESSURE: 70 MMHG

## 2020-12-07 PROCEDURE — 99213 OFFICE O/P EST LOW 20 MIN: CPT

## 2020-12-07 NOTE — DISCUSSION/SUMMARY
[FreeTextEntry1] : 34 or one history of chronic headaches, migraines, doing very well on Aimovig. No changes at this time.\par Complaint of chronic back pain, numbness of the feet, at cancel her previous appointment for electromyography and nerve conduction study of the lower extremities.\par Plan advised to reschedule.

## 2020-12-07 NOTE — HISTORY OF PRESENT ILLNESS
[FreeTextEntry1] : 44-year-old woman complaining of chronic headaches, previously prescribed Aimovig referred to results, significant reduction in her headache frequency and severity. The medication is well tolerated.\par complaining of chronic back pain, previous studies including MRI of the lumbar sacral spine demonstrated multilevel degenerative changes. Complains of numbness of the legs, especially the feet. Seeing a podiatrist.Occasionally as noted to have a letter to falls,  so far without any injuries. No bowel or bladder dysfunction. No history of diabetes or Lyme disease.

## 2020-12-07 NOTE — REVIEW OF SYSTEMS
[As Noted in HPI] : as noted in HPI [Numbness] : numbness [Tingling] : tingling [Frequent Falls] : frequent falls [Negative] : Heme/Lymph [FreeTextEntry9] : chronic back pain

## 2020-12-07 NOTE — PHYSICAL EXAM
[General Appearance - Alert] : alert [General Appearance - In No Acute Distress] : in no acute distress [Person] : oriented to person [Place] : oriented to place [Time] : oriented to time [Fluency] : fluency intact [Comprehension] : comprehension intact [Past History] : adequate knowledge of personal past history [Cranial Nerves Optic (II)] : visual acuity intact bilaterally,  visual fields full to confrontation, pupils equal round and reactive to light [Cranial Nerves Oculomotor (III)] : extraocular motion intact [Cranial Nerves Trigeminal (V)] : facial sensation intact symmetrically [Cranial Nerves Facial (VII)] : face symmetrical [Cranial Nerves Vestibulocochlear (VIII)] : hearing was intact bilaterally [Cranial Nerves Glossopharyngeal (IX)] : tongue and palate midline [Cranial Nerves Accessory (XI - Cranial And Spinal)] : head turning and shoulder shrug symmetric [Cranial Nerves Hypoglossal (XII)] : there was no tongue deviation with protrusion [Motor Tone] : muscle tone was normal in all four extremities [No Muscle Atrophy] : normal bulk in all four extremities [Motor Handedness Right-Handed] : the patient is right hand dominant [Paresis Pronator Drift Right-Sided] : no pronator drift on the right [Paresis Pronator Drift Left-Sided] : no pronator drift on the left [Motor Strength Upper Extremities Bilaterally] : strength was normal in both upper extremities [Motor Strength Upper Extremities Right] : strength was normal in the right upper extremity [Motor Strength Upper Extremities Left] : strength was normal in the left upper extremity [Motor Strength Lower Extremities Bilaterally] : there was weakness in both lower extremities [Motor Strength Lower Extremities Right] : there was weakness of the right lower extremity [Motor Strength Lower Extremities Left] : there was weakness of the left lower extremity [Sensation Tactile Decrease] : light touch was intact [Sensation Pain / Temperature Decrease] : pain and temperature was intact [Proprioception] : proprioception was intact [Romberg's Sign] : Romberg's sign was negtive [Abnormal Walk] : normal gait [Balance] : balance was intact [Past-pointing] : there was no past-pointing [Tremor] : no tremor present [Dysdiadochokinesia Bilaterally] : not present [Coordination - Dysmetria Impaired Finger-to-Nose Bilateral] : not present [2+] : Ankle jerk left 2+ [Plantar Reflex Right Only] : normal on the right [Plantar Reflex Left Only] : normal on the left

## 2020-12-08 ENCOUNTER — APPOINTMENT (OUTPATIENT)
Dept: ORTHOPEDIC SURGERY | Facility: CLINIC | Age: 35
End: 2020-12-08
Payer: MEDICARE

## 2020-12-08 DIAGNOSIS — R20.2 PARESTHESIA OF SKIN: ICD-10-CM

## 2020-12-08 PROCEDURE — 99213 OFFICE O/P EST LOW 20 MIN: CPT

## 2020-12-08 NOTE — HISTORY OF PRESENT ILLNESS
[FreeTextEntry1] : 34-year-old female presents for followup of left wrist pain as well as with a new complaint of increasing paresthesias in her left hand. She denies new trauma or and ligament. She experiences paresthesias in all digits of her left handon a daily basis and occasionally the paresthesias occur at night. She has used a wrist immobilizer which has not provided significant relief.

## 2020-12-08 NOTE — PHYSICAL EXAM
[de-identified] : Physical exam shows the patient to be alert and oriented x3, capable of ambulation. The patient is well-developed and well-nourished in no apparent respiratory distress. Majority of the skin is intact bilaterally in the upper extremities without lymphadenopathy at the elbows.\par \par There is a negative Spurling test. There is no sensitivity or Tinel's over the axilla bilaterally in the area of the brachial plexus.\par \par The elbows have a symmetric and full range of motion with no pain upon forced flexion or extension bilaterally. There is no tenderness over the medial or lateral epicondyles bilaterally. The biceps and triceps are intact bilaterally with 5 over 5 strength. There is no joint effusion or instability of the medial and lateral collateral ligament complex bilaterally. There is no sensitivity of the median ulnar or radial nerves with provocative tests bilaterally.\par \par The wrists have a symmetric range of motion bilaterally. There is no tenderness over the scaphoid, scapholunate or lunotriquetral ligaments bilaterally. There is a negative Damon test bilaterally. There is negative tenderness over the radial ulnar joint or TFCC and no evidence of instability bilaterally. No tenderness over the pisotriquetral or hamate hook or CMC joint bilaterally. There is 5 over 5 strength of the wrists bilaterally.\par \par There is a negative Finkelstein test bilaterally and no tenderness over the A1 pulleys. There is no tenderness or instability of the MP PIP or DIP joints in the digits bilaterally with no evidence of digital malrotation.\par \par There is no sensitivity or masses around the ulnar nerve at the level of the wrist bilaterally.\par \par \par There is 2+ pulses over the radial arteries bilaterally with good capillary refill.\par \par There is a positive Tinel's and a positive Phalen's test at 15 seconds on the left. There is no thenar atrophy, good opposition is present. The remaining intrinsic musculature has good strength bilaterally.\par \par \par

## 2020-12-08 NOTE — ASSESSMENT
[FreeTextEntry1] : 34-year-old female with progressive paresthesias of the left hand. Recommend EMG for further workup and use of a wrist immobilizer for nighttime symptoms. She will followup to review the results of EMG and discuss further treatment options.

## 2020-12-16 ENCOUNTER — APPOINTMENT (OUTPATIENT)
Dept: INTERNAL MEDICINE | Facility: CLINIC | Age: 35
End: 2020-12-16
Payer: MEDICARE

## 2020-12-16 VITALS
OXYGEN SATURATION: 99 % | SYSTOLIC BLOOD PRESSURE: 130 MMHG | WEIGHT: 268 LBS | TEMPERATURE: 98.1 F | DIASTOLIC BLOOD PRESSURE: 80 MMHG | HEART RATE: 110 BPM | HEIGHT: 57 IN | RESPIRATION RATE: 22 BRPM | BODY MASS INDEX: 57.82 KG/M2

## 2020-12-16 PROCEDURE — G0438: CPT

## 2020-12-16 NOTE — ASSESSMENT
[FreeTextEntry1] : 1.health maintenance: DMV forms returned to the patient today. We'll mail a copy of her annual exam to patient. Discussed fasting blood work to get. She does not wish to have influenza or pneumonia vaccine today. Up-to-date with other vaccines.\par \par 2.increased breast size: She will follow up with breast surgery to see what she can schedule surgery and return for medical clearance.\par \par 3.asthma: Follow up with pulmonary medicine, continue her current inhalers.\par \par 4.fungal rash: Continue with nystatin and Diflucan.

## 2020-12-16 NOTE — HISTORY OF PRESENT ILLNESS
[FreeTextEntry1] : Patient comes in for an annual wellness visit.\par  [de-identified] : 33 y/o F with morbid obesity comes in for annual exam.\par Pt notes that her breast size is causing her to have shortness of breath, cp, she is seeing breast surgeon to see if breast reduction is possibility. \par She needs DMV forms today for transportation. \par Patient goes to a day program daily for psychiatry and need for a possible PPD versus blood test for this as well as a copy of her annual physical. She will call back if she needs a PPD.\par Patient has had a fungal rash underneath both of her breasts and extending to her abdomen for the past 1-1/2 months. She is seeing her doctor for that and was recently given nystatin cream and Diflucan to take once a week.\par Patient denies any cp, sob,abdominal pain, nausea, vomiting, palpitations, fever, chills, constipation, diarrhea.\par

## 2021-01-02 ENCOUNTER — APPOINTMENT (OUTPATIENT)
Dept: DISASTER EMERGENCY | Facility: CLINIC | Age: 36
End: 2021-01-02

## 2021-01-03 LAB — SARS-COV-2 N GENE NPH QL NAA+PROBE: NOT DETECTED

## 2021-01-06 ENCOUNTER — APPOINTMENT (OUTPATIENT)
Dept: INTERNAL MEDICINE | Facility: CLINIC | Age: 36
End: 2021-01-06
Payer: MEDICARE

## 2021-01-06 VITALS
WEIGHT: 266 LBS | HEART RATE: 116 BPM | OXYGEN SATURATION: 98 % | TEMPERATURE: 98.2 F | DIASTOLIC BLOOD PRESSURE: 78 MMHG | SYSTOLIC BLOOD PRESSURE: 134 MMHG | RESPIRATION RATE: 24 BRPM | BODY MASS INDEX: 57.39 KG/M2 | HEIGHT: 57 IN

## 2021-01-06 DIAGNOSIS — R06.00 DYSPNEA, UNSPECIFIED: ICD-10-CM

## 2021-01-06 DIAGNOSIS — N62 HYPERTROPHY OF BREAST: ICD-10-CM

## 2021-01-06 PROCEDURE — XXXXX: CPT

## 2021-01-06 PROCEDURE — 94010 BREATHING CAPACITY TEST: CPT

## 2021-01-06 PROCEDURE — 94729 DIFFUSING CAPACITY: CPT

## 2021-01-06 PROCEDURE — 94727 GAS DIL/WSHOT DETER LNG VOL: CPT

## 2021-01-06 PROCEDURE — 99205 OFFICE O/P NEW HI 60 MIN: CPT | Mod: 25

## 2021-01-06 NOTE — DISCUSSION/SUMMARY
[FreeTextEntry1] : Ms. Aj is a 35-year-old morbidly obese female with significant gynecomastia. Patient states that she has significant shortness of breath as well as severe shoulder pain and back pain as a result of her enlarged breast tissue. She wishes to undergo bilateral breast reduction surgery. I've explained to the patient that she is at high risk for postoperative complications including possible prolonged ventilation as well as venous thromboembolism. Pulmonary function tests show restrictive lung defect consistent with morbid obesity. Diffusing capacity is decreased indicating some degree of pulmonary vascular disease. There is no absolute contraindication to planned procedure with anesthesia/sedation. Ms. Aj is a high-risk patient scheduled for an intermediate risk procedure. The patient will benefit from DVT prophylaxis and incentive spirometry postoperatively.

## 2021-01-06 NOTE — HISTORY OF PRESENT ILLNESS
[TextBox_4] : Ms. Aj is a 35-year-old obese female who presents for preoperative pulmonary evaluation for bilateral breast reduction surgery. Patient has a history of significant gynecomastia for many years. She is also morbidly obese with short stature. As a result of her large breasts the patient has significant back pain as well as shortness of breath. She also has a history of asthma and is currently on Advair 2:30/21 mcg 2 puffs b.i.d. with albuterol p.r.n. for rescue therapy. Patient states she has had previous polysomnography examination and does not have obstructive sleep apnea. She denies daytime tiredness. Ms. Hou does have shortness of breath with mild exertion. She admits to poor cardiovascular fitness. She is being followed by a cardiologist.

## 2021-01-06 NOTE — PROCEDURE
[FreeTextEntry1] : Complete pulmonary function tests show significant restrictive lung defect. FEV1 is 1.62 L which is 78% predicted. FEV1/FVC ratio is 84%. He just reported as 2.88 L per second which is 45% predicted. Total in capacity is 1.95 L which is 50% predicted. Diffusing capacity is 44% predicted. This reportedly low diffusing capacity may represent some degree of pulmonary vascular disease.

## 2021-01-06 NOTE — REASON FOR VISIT
[Initial] : an initial visit [Asthma] : asthma [Pre-op Risk Stratification] : pre-op risk stratification

## 2021-01-06 NOTE — PHYSICAL EXAM
[No Acute Distress] : no acute distress [Normal Oropharynx] : normal oropharynx [Normal Appearance] : normal appearance [No Neck Mass] : no neck mass [Normal Rate/Rhythm] : normal rate/rhythm [Normal S1, S2] : normal s1, s2 [No Murmurs] : no murmurs [No Resp Distress] : no resp distress [Clear to Auscultation Bilaterally] : clear to auscultation bilaterally [No Abnormalities] : no abnormalities [Benign] : benign [Normal Gait] : normal gait [No Clubbing] : no clubbing [No Cyanosis] : no cyanosis [No Edema] : no edema [FROM] : FROM [Normal Color/ Pigmentation] : normal color/ pigmentation [No Focal Deficits] : no focal deficits [Oriented x3] : oriented x3 [Normal Affect] : normal affect [TextBox_80] : gynecomastia

## 2021-01-08 ENCOUNTER — APPOINTMENT (OUTPATIENT)
Dept: OBGYN | Facility: CLINIC | Age: 36
End: 2021-01-08
Payer: MEDICARE

## 2021-01-08 PROCEDURE — 99205 OFFICE O/P NEW HI 60 MIN: CPT

## 2021-01-08 NOTE — PHYSICAL EXAM
[Appropriately responsive] : appropriately responsive [Alert] : alert [No Acute Distress] : no acute distress [No Lymphadenopathy] : no lymphadenopathy [Regular Rate Rhythm] : regular rate rhythm [No Murmurs] : no murmurs [Clear to Auscultation B/L] : clear to auscultation bilaterally [Soft] : soft [Non-tender] : non-tender [Non-distended] : non-distended [No HSM] : No HSM [No Lesions] : no lesions [No Mass] : no mass [Oriented x3] : oriented x3 [FreeTextEntry7] : OBESE [Breast Hypertrophy Bilateral] : hypertrophy

## 2021-01-08 NOTE — HISTORY OF PRESENT ILLNESS
[FreeTextEntry1] : PT WITH LEFT OVARIAN CYST AND OLIGOMENORRHEA FOR EVALUATION. DENIES PAIN. THIS CYST HAS BEEN FOLLOWED FOR SEVERAL YEARS NOW AND KEEPS INCREASING IN SIZE

## 2021-01-08 NOTE — PLAN
[FreeTextEntry1] : PT WITH PERSISTENT LEFT OVARIAN CYST FOR SURGICAL CONSULT. DISCUSSED WORKUP TO DATE. DISCUSSED SURGICAL APPROACH TO SURGERY. DISCUSSED CYSTECTOMY VS OOPHORECTOMY. WILL SCHEDULE OVARIAN CYSTECTOMY. WILL NEED MEDICAL CLEARANCE.

## 2021-01-27 ENCOUNTER — APPOINTMENT (OUTPATIENT)
Dept: NEUROLOGY | Facility: CLINIC | Age: 36
End: 2021-01-27
Payer: MEDICARE

## 2021-01-27 PROCEDURE — 95909 NRV CNDJ TST 5-6 STUDIES: CPT

## 2021-01-27 PROCEDURE — 95885 MUSC TST DONE W/NERV TST LIM: CPT

## 2021-01-27 NOTE — PHYSICAL EXAM
[General Appearance - Alert] : alert [General Appearance - In No Acute Distress] : in no acute distress [Person] : oriented to person [Place] : oriented to place [Time] : oriented to time [Past History] : adequate knowledge of personal past history [Cranial Nerves Optic (II)] : visual acuity intact bilaterally,  visual fields full to confrontation, pupils equal round and reactive to light [Cranial Nerves Oculomotor (III)] : extraocular motion intact [Cranial Nerves Facial (VII)] : face symmetrical [Cranial Nerves Trigeminal (V)] : facial sensation intact symmetrically [Cranial Nerves Vestibulocochlear (VIII)] : hearing was intact bilaterally [Cranial Nerves Glossopharyngeal (IX)] : tongue and palate midline [Cranial Nerves Accessory (XI - Cranial And Spinal)] : head turning and shoulder shrug symmetric [Cranial Nerves Hypoglossal (XII)] : there was no tongue deviation with protrusion [Motor Tone] : muscle tone was normal in all four extremities [No Muscle Atrophy] : normal bulk in all four extremities [Motor Handedness Right-Handed] : the patient is right hand dominant [Paresis Pronator Drift Right-Sided] : no pronator drift on the right [Paresis Pronator Drift Left-Sided] : no pronator drift on the left [Motor Strength Upper Extremities Bilaterally] : strength was normal in both upper extremities [Motor Strength Upper Extremities Right] : strength was normal in the right upper extremity [Motor Strength Upper Extremities Left] : strength was normal in the left upper extremity [Motor Strength Lower Extremities Right] : there was weakness of the right lower extremity [Motor Strength Lower Extremities Bilaterally] : there was weakness in both lower extremities [Motor Strength Lower Extremities Left] : there was weakness of the left lower extremity [Sensation Tactile Decrease] : light touch was intact [Proprioception] : proprioception was intact [Romberg's Sign] : Romberg's sign was negtive [Abnormal Walk] : normal gait [Past-pointing] : there was no past-pointing [Balance] : balance was intact [Tremor] : no tremor present [Dysdiadochokinesia Bilaterally] : not present [Coordination - Dysmetria Impaired Finger-to-Nose Bilateral] : not present [2+] : Ankle jerk left 2+

## 2021-01-27 NOTE — HISTORY OF PRESENT ILLNESS
[FreeTextEntry1] : 35-year-old woman complaining of numbness and tingling of the legs, more on the right than the left, history of chronic back pain.

## 2021-01-27 NOTE — DISCUSSION/SUMMARY
[FreeTextEntry1] : 35-year-old woman complaining of chronic back pain,bilateral numbness of the legs, electrodiagnostic studies limited the patient's request. No evidence of a right leg neuropathy.

## 2021-02-18 LAB
ALBUMIN SERPL ELPH-MCNC: 4 G/DL
ALP BLD-CCNC: 80 U/L
ALT SERPL-CCNC: 16 U/L
ANION GAP SERPL CALC-SCNC: 15 MMOL/L
AST SERPL-CCNC: 11 U/L
BASOPHILS # BLD AUTO: 0.07 K/UL
BASOPHILS NFR BLD AUTO: 0.6 %
BILIRUB SERPL-MCNC: <0.2 MG/DL
BUN SERPL-MCNC: 18 MG/DL
CALCIUM SERPL-MCNC: 9.6 MG/DL
CHLORIDE SERPL-SCNC: 104 MMOL/L
CHOLEST SERPL-MCNC: 199 MG/DL
CO2 SERPL-SCNC: 22 MMOL/L
CREAT SERPL-MCNC: 0.73 MG/DL
EOSINOPHIL # BLD AUTO: 0.15 K/UL
EOSINOPHIL NFR BLD AUTO: 1.4 %
ESTIMATED AVERAGE GLUCOSE: 111 MG/DL
GLUCOSE SERPL-MCNC: 121 MG/DL
HBA1C MFR BLD HPLC: 5.5 %
HCT VFR BLD CALC: 35.2 %
HDLC SERPL-MCNC: 38 MG/DL
HGB BLD-MCNC: 10.9 G/DL
IMM GRANULOCYTES NFR BLD AUTO: 0.5 %
LDLC SERPL CALC-MCNC: 129 MG/DL
LYMPHOCYTES # BLD AUTO: 2.46 K/UL
LYMPHOCYTES NFR BLD AUTO: 22.4 %
M TB IFN-G BLD-IMP: NEGATIVE
MAN DIFF?: NORMAL
MCHC RBC-ENTMCNC: 28.4 PG
MCHC RBC-ENTMCNC: 31 GM/DL
MCV RBC AUTO: 91.7 FL
MONOCYTES # BLD AUTO: 0.62 K/UL
MONOCYTES NFR BLD AUTO: 5.7 %
NEUTROPHILS # BLD AUTO: 7.61 K/UL
NEUTROPHILS NFR BLD AUTO: 69.4 %
NONHDLC SERPL-MCNC: 161 MG/DL
PLATELET # BLD AUTO: 253 K/UL
POTASSIUM SERPL-SCNC: 4.5 MMOL/L
PROT SERPL-MCNC: 6.7 G/DL
QUANTIFERON TB PLUS MITOGEN MINUS NIL: 1.2 IU/ML
QUANTIFERON TB PLUS NIL: 0.03 IU/ML
QUANTIFERON TB PLUS TB1 MINUS NIL: 0.01 IU/ML
QUANTIFERON TB PLUS TB2 MINUS NIL: 0 IU/ML
RBC # BLD: 3.84 M/UL
RBC # FLD: 14.3 %
SODIUM SERPL-SCNC: 142 MMOL/L
TRIGL SERPL-MCNC: 160 MG/DL
TSH SERPL-ACNC: 2.46 UIU/ML
WBC # FLD AUTO: 10.96 K/UL

## 2021-02-23 ENCOUNTER — NON-APPOINTMENT (OUTPATIENT)
Age: 36
End: 2021-02-23

## 2021-02-26 ENCOUNTER — APPOINTMENT (OUTPATIENT)
Dept: NEUROLOGY | Facility: CLINIC | Age: 36
End: 2021-02-26

## 2021-03-19 ENCOUNTER — APPOINTMENT (OUTPATIENT)
Dept: INTERNAL MEDICINE | Facility: CLINIC | Age: 36
End: 2021-03-19

## 2021-03-24 ENCOUNTER — APPOINTMENT (OUTPATIENT)
Dept: INTERNAL MEDICINE | Facility: CLINIC | Age: 36
End: 2021-03-24
Payer: MEDICARE

## 2021-03-24 ENCOUNTER — APPOINTMENT (OUTPATIENT)
Dept: DERMATOLOGY | Facility: CLINIC | Age: 36
End: 2021-03-24
Payer: MEDICARE

## 2021-03-24 ENCOUNTER — NON-APPOINTMENT (OUTPATIENT)
Age: 36
End: 2021-03-24

## 2021-03-24 VITALS
SYSTOLIC BLOOD PRESSURE: 100 MMHG | WEIGHT: 276 LBS | BODY MASS INDEX: 59.54 KG/M2 | HEART RATE: 118 BPM | DIASTOLIC BLOOD PRESSURE: 74 MMHG | HEIGHT: 57 IN | RESPIRATION RATE: 18 BRPM | TEMPERATURE: 96.8 F | OXYGEN SATURATION: 98 %

## 2021-03-24 PROCEDURE — 99203 OFFICE O/P NEW LOW 30 MIN: CPT

## 2021-03-24 PROCEDURE — 99214 OFFICE O/P EST MOD 30 MIN: CPT

## 2021-03-24 RX ORDER — FLUOXETINE HYDROCHLORIDE 40 MG/1
40 CAPSULE ORAL
Refills: 0 | Status: DISCONTINUED | COMMUNITY
End: 2021-03-24

## 2021-03-24 RX ORDER — FLUVOXAMINE MALEATE 100 MG/1
100 CAPSULE, EXTENDED RELEASE ORAL
Refills: 0 | Status: ACTIVE | COMMUNITY

## 2021-03-24 RX ORDER — PANTOPRAZOLE SODIUM 40 MG/1
40 GRANULE, DELAYED RELEASE ORAL
Refills: 0 | Status: ACTIVE | COMMUNITY

## 2021-03-24 RX ORDER — PANTOPRAZOLE SODIUM 40 MG/1
40 TABLET, DELAYED RELEASE ORAL
Refills: 0 | Status: DISCONTINUED | COMMUNITY
End: 2021-03-24

## 2021-03-24 RX ORDER — HYDROCORTISONE 25 MG/G
2.5 CREAM TOPICAL
Refills: 0 | Status: DISCONTINUED | COMMUNITY
End: 2021-03-24

## 2021-03-24 RX ORDER — TRIAMCINOLONE ACETONIDE 40 MG/ML
INJECTION, SUSPENSION INTRA-ARTICULAR; INTRAMUSCULAR
Refills: 0 | Status: DISCONTINUED | COMMUNITY
End: 2021-03-24

## 2021-03-24 RX ORDER — METOPROLOL SUCCINATE 50 MG/1
50 TABLET, EXTENDED RELEASE ORAL
Refills: 0 | Status: ACTIVE | COMMUNITY

## 2021-03-24 RX ORDER — DEXAMETHASONE SODIUM PHOSPHATE 0.1 %
0.1 DROPS OPHTHALMIC (EYE)
Refills: 0 | Status: DISCONTINUED | COMMUNITY
End: 2021-03-24

## 2021-03-24 RX ORDER — BUDESONIDE 0.5 MG/2ML
0.5 SUSPENSION RESPIRATORY (INHALATION)
Refills: 0 | Status: ACTIVE | COMMUNITY

## 2021-03-24 RX ORDER — TOPIRAMATE 25 MG/1
25 TABLET, FILM COATED ORAL
Refills: 0 | Status: DISCONTINUED | COMMUNITY
End: 2021-03-24

## 2021-03-24 NOTE — PHYSICAL EXAM
[FreeTextEntry3] : R upper medial breast; \par 25 x 13 mm atrophic regular plaque, with firm regular papule at lateral edge; \par no subQ induration\par non-tender; \par

## 2021-03-24 NOTE — ASSESSMENT
[FreeTextEntry1] : Therapeutic options and their risks and benefits; along with multiple diagnostic possibilities were discussed at length; risks and benefits of further study were discussed;\par \par lesion consistent with dermatofibroma, with anetoderma like changes; \par no suspicious features; \par would monitor;  photo taken;  obtain old biopsy report; \par f/u 6 mos to recheck;  sooner prn;

## 2021-03-24 NOTE — HISTORY OF PRESENT ILLNESS
[de-identified] : Pt. c/o lesion on breast;  states was biopsied by other physician;  \par recommended removal; \par lesion present since teens; no Sxs; \par has squeezed it, drained; \par dx with dermatofibroma as per patient\par

## 2021-03-25 NOTE — HISTORY OF PRESENT ILLNESS
[FreeTextEntry1] : TERRI VALLECILLO is a 35 year F who comes in for a follow up visit.\par  [de-identified] : Pt did see ortho spine- and she is getting another MRI L spine next week, had one last year herniated discs. She is getting PT for back pain, hip and leg pain, which minimally helps.\par She did see pulmonary medicine and needs sleep study. \par SHe also saw Dr.Kristin Oakes, cardiology and EKG showed tachycardia and 24 Holter monitor and was put on new medication. She is going to get another Holter monitor next week. She was also put on hctz to lower LE edema. \par She also sees urology, Dr.Jason Pruitt-and he wants her to have bladder pacemaker due to incontinence. \par She had her motorized scooter approved.\par She still sees pain management doctor and recommends cardiac rehab.\par She sees three different mental health therapists due to increase in mood swings, panic attacks. \par

## 2021-03-25 NOTE — ASSESSMENT
[FreeTextEntry1] : 1.back pain: Follow up with orthopedic spine surgery and repeat MRI of the lumbar spine next week. Continue on soma for pain relief and follow up with pain management.\par \par 2.depression and anxiety colon with recent exacerbation. Continue followup with psychiatrist and therapist x3 and continue all psychiatric medications. Next\par \par 3.tachycardia with edema: Recently put on [place], Inderal, Toprol and hydrochlorothiazide by cardiology. Follow up with cardiology regarding Holter monitor results.\par \par 4.morbid obesity with enlarged breasts: She is still working with her insurance to get clearance for the surgery. She's not been cleared by pulmonary medicine or cardiology at this time. She will need sleep study. Discussed that she'll need cardiac and pulmonary clearance prior to medical clearance.\par \par 5.hyperlipidemia: Lipitor 20 mg refill today.\par \par 6.urinary incontinence: Continue on all current medications and followup with urology regarding having bladder pacemaker placed. She'll return for medical clearance.

## 2021-06-01 ENCOUNTER — APPOINTMENT (OUTPATIENT)
Dept: OBGYN | Facility: CLINIC | Age: 36
End: 2021-06-01
Payer: MEDICARE

## 2021-06-01 PROCEDURE — 99215 OFFICE O/P EST HI 40 MIN: CPT

## 2021-06-01 NOTE — REVIEW OF SYSTEMS
[Patient Intake Form Reviewed] : Patient intake form was reviewed [Pelvic pain] : pelvic pain [FreeTextEntry2] : RASH UNDER BREASTS

## 2021-06-01 NOTE — PLAN
[FreeTextEntry1] : PT WITH SYMPTOMATIC LEFT OVARIAN CYST. WANTS TO PROCEED TO SURGERY. WILL GET SONO PRIOR TO SURGERY. RX FOR NYSTATIN POWDER. WILL NEED MEDICAL CLEARANCE

## 2021-06-01 NOTE — COUNSELING
[Medication Management] : medication management [Pre/Post Op Instructions] : pre/post op instructions

## 2021-06-11 ENCOUNTER — APPOINTMENT (OUTPATIENT)
Dept: INTERNAL MEDICINE | Facility: CLINIC | Age: 36
End: 2021-06-11
Payer: MEDICARE

## 2021-06-11 VITALS
HEIGHT: 56 IN | DIASTOLIC BLOOD PRESSURE: 64 MMHG | OXYGEN SATURATION: 99 % | TEMPERATURE: 96.8 F | WEIGHT: 279 LBS | HEART RATE: 111 BPM | SYSTOLIC BLOOD PRESSURE: 108 MMHG | BODY MASS INDEX: 62.76 KG/M2

## 2021-06-11 PROCEDURE — 99214 OFFICE O/P EST MOD 30 MIN: CPT

## 2021-06-11 RX ORDER — VERAPAMIL HYDROCHLORIDE 120 MG/1
120 TABLET ORAL
Refills: 0 | Status: DISCONTINUED | COMMUNITY
End: 2021-06-11

## 2021-06-11 NOTE — ASSESSMENT
[FreeTextEntry1] : 1.candidiasis of the groin: Discussed using nystatin cream to affected areas and discussed preventive measures with patient.\par \par 2.left ovarian cyst: She will followup with GYN for presurgical testing and return here for medical clearance after July 11.\par \par 3. history of back pain: Patient has maxed out her physical therapy for 20/20 one. Pain management-Winter Reeder has referred her to cardiac rehabilitation at [place] and we'll try and get her a TENS unit to use at home.

## 2021-06-11 NOTE — HISTORY OF PRESENT ILLNESS
[FreeTextEntry8] : Ms. TERRI VALLECILLO is a 35 year F who comes in for an acute visit.\par Patient states she saw OB/GYN last week for a rash underneath her breasts and was prescribed nystatin powder for a yeast infection. For the last one week she's had pain and rash in both of her groins as well. She is not able to visualize the area but does note in order when taking a bath.\par She has a history of a left ovarian cyst which is causing pain and will need surgery for removal and is scheduled for August 11.\par Patient denies any cp, sob,abdominal pain, nausea, vomiting, palpitations, fever, chills, constipation, diarrhea.\par

## 2021-06-14 ENCOUNTER — APPOINTMENT (OUTPATIENT)
Dept: NEUROLOGY | Facility: CLINIC | Age: 36
End: 2021-06-14

## 2021-06-16 ENCOUNTER — APPOINTMENT (OUTPATIENT)
Dept: INTERNAL MEDICINE | Facility: CLINIC | Age: 36
End: 2021-06-16

## 2021-07-16 ENCOUNTER — APPOINTMENT (OUTPATIENT)
Dept: NEUROLOGY | Facility: CLINIC | Age: 36
End: 2021-07-16

## 2021-07-23 ENCOUNTER — APPOINTMENT (OUTPATIENT)
Dept: INTERNAL MEDICINE | Facility: CLINIC | Age: 36
End: 2021-07-23

## 2021-08-06 ENCOUNTER — APPOINTMENT (OUTPATIENT)
Dept: NEUROLOGY | Facility: CLINIC | Age: 36
End: 2021-08-06

## 2021-09-07 ENCOUNTER — APPOINTMENT (OUTPATIENT)
Dept: INTERNAL MEDICINE | Facility: CLINIC | Age: 36
End: 2021-09-07
Payer: MEDICARE

## 2021-09-07 VITALS
SYSTOLIC BLOOD PRESSURE: 120 MMHG | HEIGHT: 56 IN | WEIGHT: 285 LBS | DIASTOLIC BLOOD PRESSURE: 66 MMHG | HEART RATE: 110 BPM | OXYGEN SATURATION: 99 % | BODY MASS INDEX: 64.11 KG/M2 | TEMPERATURE: 98.3 F

## 2021-09-07 PROCEDURE — 99214 OFFICE O/P EST MOD 30 MIN: CPT

## 2021-09-09 RX ORDER — FLUTICASONE PROPIONATE AND SALMETEROL 50; 100 UG/1; UG/1
100-50 POWDER RESPIRATORY (INHALATION)
Qty: 60 | Refills: 0 | Status: ACTIVE | COMMUNITY
Start: 2021-03-23

## 2021-09-09 RX ORDER — PREGABALIN 100 MG/1
100 CAPSULE ORAL
Refills: 0 | Status: ACTIVE | COMMUNITY

## 2021-09-09 NOTE — ASSESSMENT
[FreeTextEntry1] : 1.Morbid obesity with binge eating disorder: referred to [facility] ([employer] [facility] [employer]) and to obesity medicine. Does not wish to consider bariatric surgery at this time due to binge eating. \par \par 2. Left ovarian cyst: would like to proceed with excision on 10/6/21, has PST 9/24 and will return for MCA appnt post pst. She has cardiac clearance on 8/18 and will f/u for pulm clearance as well.

## 2021-09-09 NOTE — HISTORY OF PRESENT ILLNESS
[FreeTextEntry1] : FU [de-identified] : TERRI VALLECILLO is a 35 year F who comes in for a follow up visit.\par Pt notes she is not able to lose weight and that is affecting her ADL's. She is finding it difficult to bathe herself/personal hygiene. She was diagnosed with binge eating disorder by her psychiatrist and notes she binges on sweets/salty food and then vomits but then binges again. She is unable to stop cycle and continues to gain weight. \par She has pain in left lower abdomen associated with left ovarian cyst and would like to proceed with surgery.\par She met with breast surgeon, Dr.Meredith Brady and was told she will cardiac and pulm clearance to proceed with breast reduction surgery. \par She did obtain cardiac clearance on 8/18/21.

## 2021-09-28 ENCOUNTER — OUTPATIENT (OUTPATIENT)
Dept: OUTPATIENT SERVICES | Facility: HOSPITAL | Age: 36
LOS: 1 days | End: 2021-09-28
Payer: MEDICARE

## 2021-09-28 VITALS
HEART RATE: 111 BPM | DIASTOLIC BLOOD PRESSURE: 86 MMHG | OXYGEN SATURATION: 100 % | RESPIRATION RATE: 16 BRPM | HEIGHT: 57 IN | TEMPERATURE: 99 F | SYSTOLIC BLOOD PRESSURE: 139 MMHG | WEIGHT: 287.04 LBS

## 2021-09-28 DIAGNOSIS — Z01.818 ENCOUNTER FOR OTHER PREPROCEDURAL EXAMINATION: ICD-10-CM

## 2021-09-28 DIAGNOSIS — N83.292 OTHER OVARIAN CYST, LEFT SIDE: ICD-10-CM

## 2021-09-28 DIAGNOSIS — Z98.890 OTHER SPECIFIED POSTPROCEDURAL STATES: Chronic | ICD-10-CM

## 2021-09-28 LAB
ANION GAP SERPL CALC-SCNC: 5 MMOL/L — SIGNIFICANT CHANGE UP (ref 5–17)
APPEARANCE UR: ABNORMAL
BASOPHILS # BLD AUTO: 0.08 K/UL — SIGNIFICANT CHANGE UP (ref 0–0.2)
BASOPHILS NFR BLD AUTO: 0.8 % — SIGNIFICANT CHANGE UP (ref 0–2)
BILIRUB UR-MCNC: NEGATIVE — SIGNIFICANT CHANGE UP
BUN SERPL-MCNC: 16 MG/DL — SIGNIFICANT CHANGE UP (ref 7–23)
CALCIUM SERPL-MCNC: 9.2 MG/DL — SIGNIFICANT CHANGE UP (ref 8.5–10.1)
CHLORIDE SERPL-SCNC: 106 MMOL/L — SIGNIFICANT CHANGE UP (ref 96–108)
CO2 SERPL-SCNC: 25 MMOL/L — SIGNIFICANT CHANGE UP (ref 22–31)
COLOR SPEC: YELLOW — SIGNIFICANT CHANGE UP
CREAT SERPL-MCNC: 0.66 MG/DL — SIGNIFICANT CHANGE UP (ref 0.5–1.3)
DIFF PNL FLD: NEGATIVE — SIGNIFICANT CHANGE UP
EOSINOPHIL # BLD AUTO: 0.07 K/UL — SIGNIFICANT CHANGE UP (ref 0–0.5)
EOSINOPHIL NFR BLD AUTO: 0.7 % — SIGNIFICANT CHANGE UP (ref 0–6)
GLUCOSE SERPL-MCNC: 108 MG/DL — HIGH (ref 70–99)
GLUCOSE UR QL: NEGATIVE MG/DL — SIGNIFICANT CHANGE UP
HCT VFR BLD CALC: 36.3 % — SIGNIFICANT CHANGE UP (ref 34.5–45)
HGB BLD-MCNC: 11.9 G/DL — SIGNIFICANT CHANGE UP (ref 11.5–15.5)
IMM GRANULOCYTES NFR BLD AUTO: 0.9 % — SIGNIFICANT CHANGE UP (ref 0–1.5)
KETONES UR-MCNC: NEGATIVE — SIGNIFICANT CHANGE UP
LEUKOCYTE ESTERASE UR-ACNC: NEGATIVE — SIGNIFICANT CHANGE UP
LYMPHOCYTES # BLD AUTO: 2.62 K/UL — SIGNIFICANT CHANGE UP (ref 1–3.3)
LYMPHOCYTES # BLD AUTO: 25.8 % — SIGNIFICANT CHANGE UP (ref 13–44)
MCHC RBC-ENTMCNC: 28.3 PG — SIGNIFICANT CHANGE UP (ref 27–34)
MCHC RBC-ENTMCNC: 32.8 GM/DL — SIGNIFICANT CHANGE UP (ref 32–36)
MCV RBC AUTO: 86.4 FL — SIGNIFICANT CHANGE UP (ref 80–100)
MONOCYTES # BLD AUTO: 0.65 K/UL — SIGNIFICANT CHANGE UP (ref 0–0.9)
MONOCYTES NFR BLD AUTO: 6.4 % — SIGNIFICANT CHANGE UP (ref 2–14)
NEUTROPHILS # BLD AUTO: 6.66 K/UL — SIGNIFICANT CHANGE UP (ref 1.8–7.4)
NEUTROPHILS NFR BLD AUTO: 65.4 % — SIGNIFICANT CHANGE UP (ref 43–77)
NITRITE UR-MCNC: NEGATIVE — SIGNIFICANT CHANGE UP
PH UR: 6 — SIGNIFICANT CHANGE UP (ref 5–8)
PLATELET # BLD AUTO: 254 K/UL — SIGNIFICANT CHANGE UP (ref 150–400)
POTASSIUM SERPL-MCNC: 3.9 MMOL/L — SIGNIFICANT CHANGE UP (ref 3.5–5.3)
POTASSIUM SERPL-SCNC: 3.9 MMOL/L — SIGNIFICANT CHANGE UP (ref 3.5–5.3)
PROT UR-MCNC: NEGATIVE MG/DL — SIGNIFICANT CHANGE UP
RBC # BLD: 4.2 M/UL — SIGNIFICANT CHANGE UP (ref 3.8–5.2)
RBC # FLD: 14.6 % — HIGH (ref 10.3–14.5)
SODIUM SERPL-SCNC: 136 MMOL/L — SIGNIFICANT CHANGE UP (ref 135–145)
SP GR SPEC: 1.01 — SIGNIFICANT CHANGE UP (ref 1.01–1.02)
UROBILINOGEN FLD QL: NEGATIVE MG/DL — SIGNIFICANT CHANGE UP
WBC # BLD: 10.17 K/UL — SIGNIFICANT CHANGE UP (ref 3.8–10.5)
WBC # FLD AUTO: 10.17 K/UL — SIGNIFICANT CHANGE UP (ref 3.8–10.5)

## 2021-09-28 PROCEDURE — 85025 COMPLETE CBC W/AUTO DIFF WBC: CPT

## 2021-09-28 PROCEDURE — 86900 BLOOD TYPING SEROLOGIC ABO: CPT

## 2021-09-28 PROCEDURE — 36415 COLL VENOUS BLD VENIPUNCTURE: CPT

## 2021-09-28 PROCEDURE — 86901 BLOOD TYPING SEROLOGIC RH(D): CPT

## 2021-09-28 PROCEDURE — G0463: CPT | Mod: 25

## 2021-09-28 PROCEDURE — 81001 URINALYSIS AUTO W/SCOPE: CPT

## 2021-09-28 PROCEDURE — 80048 BASIC METABOLIC PNL TOTAL CA: CPT

## 2021-09-28 PROCEDURE — 86850 RBC ANTIBODY SCREEN: CPT

## 2021-09-28 NOTE — H&P PST ADULT - HEALTH CARE MAINTENANCE
Pt denies travel out of [facility] for the past 14 days Pt denies  travel internationally for the past 21 days

## 2021-09-28 NOTE — H&P PST ADULT - HISTORY OF PRESENT ILLNESS
35 year old female with left ovarian cyst ; incidental finding for work up for frequent UTI ; Pt said cyst has increased in size denies abdominal pain; she presents to PST for planned robotic left ovarian cystectomy

## 2021-09-28 NOTE — H&P PST ADULT - NSICDXPASTMEDICALHX_GEN_ALL_CORE_FT
PAST MEDICAL HISTORY:  Anxiety     Benign intracranial hypertension     Binge eating     Bipolar I disorder     Constipation     COVID-19 vaccine series completed Moderna second dose 4/2021    Depression     Edema     Environmental and seasonal allergies     Extrinsic asthma     Fibromyalgia     Gastric ulcer     Hemorrhoids     History of chronic pain     HLD (hyperlipidemia)     HTN (hypertension)     Hypercholesterolemia with endogenous hyperglyceridemia     Migraine headache     Morbid obesity     Ovarian cyst     Panic attack     UTI (urinary tract infection)     Vaginal yeast infection      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)     Depression     Dermatofibroma of female breast     Edema     Environmental and seasonal allergies     Extrinsic asthma     Fibromyalgia     Gastric ulcer     H/O benign neoplasm of bladder     Hemorrhoids     History of chronic pain     HLD (hyperlipidemia)     HTN (hypertension)     Hypercholesterolemia with endogenous hyperglyceridemia     IBS (irritable bowel syndrome) constipation    Macromastia     Migraine headache     Morbid obesity     Morbid obesity     ALEJO (obstructive sleep apnea) mild pulm consult 9/2021    Ovarian cyst     Panic attack     Tachycardia     UTI (urinary tract infection)     Vaginal yeast infection

## 2021-09-28 NOTE — H&P PST ADULT - NSICDXFAMILYHX_GEN_ALL_CORE_FT
FAMILY HISTORY:  Grandparent  Still living? Unknown  FH: colon cancer, Age at diagnosis: Age Unknown  FH: heart disease, Age at diagnosis: Age Unknown

## 2021-09-28 NOTE — H&P PST ADULT - ASSESSMENT
35 year old female with left ovarian cyst ; incidental finding for work up for frequent UTI ; Pt said cyst has increased in size denies abdominal pain; she presents to PST for planned robotic left ovarian cystectomy     Plan:  1. PST instructions given ; NPO status instructions to be given by ASU   2. Pt instructed to take following meds: azelastin protonix pulmicort famotidine advair propranolol albuterol lexapro prozac imitrex prn luvox ativan prn ( to notify anesthesia)  3. Pt instructed to take routine evening medications unless indicated   4. Stop NSAIDS ( Aspirin Alev Motrin Mobic Diclofenac), herbal supplements , MVI , Vitamin fish oil 7 days prior to surgery  unless   directed by surgeon or cardiologist;   5. Medical Optimization  with    6. EZ wash instructions given & mupirocin instructions given  7. Labs EKG  as per surgeon request   8. Pt instructed to self quarantine after Covid test   9. Covid Testing scheduled Pt notified and aware  10. Pt denies covid symptoms shortness of breath fever cough    35 year old female with left ovarian cyst ; incidental finding for work up for frequent UTI ; Pt said cyst has increased in size denies abdominal pain; she presents to PST for planned robotic left ovarian cystectomy     Plan:  1. PST instructions given ; NPO status instructions to be given by ASU   2. Pt instructed to take following meds: azelastin protonix pulmicort famotidine advair propranolol albuterol lexapro prozac imitrex prn luvox ativan prn ( to notify anesthesia)  3. Pt instructed to take routine evening medications unless indicated   4. Stop NSAIDS ( Aspirin Alev Motrin Mobic Diclofenac), herbal supplements , MVI , Vitamin fish oil 7 days prior to surgery  unless   directed by surgeon or cardiologist;   5. Cardiac Optimization  with Dr Saul Rogers and pulmonary Dr Hannah   6. EZ wash instructions given & mupirocin instructions given  7. Labs EKG  as per surgeon request   8. Pt instructed to self quarantine after Covid test   9. Covid Testing scheduled Pt notified and aware  10. Pt denies covid symptoms shortness of breath fever cough

## 2021-09-28 NOTE — H&P PST ADULT - NSANTHOSAYNRD_GEN_A_CORE
No. ALEJO screening performed.  STOP BANG Legend: 0-2 = LOW Risk; 3-4 = INTERMEDIATE Risk; 5-8 = HIGH Risk Yes

## 2021-09-29 DIAGNOSIS — Z01.818 ENCOUNTER FOR OTHER PREPROCEDURAL EXAMINATION: ICD-10-CM

## 2021-09-29 DIAGNOSIS — N83.292 OTHER OVARIAN CYST, LEFT SIDE: ICD-10-CM

## 2021-09-29 PROBLEM — F41.0 PANIC DISORDER [EPISODIC PAROXYSMAL ANXIETY]: Chronic | Status: ACTIVE | Noted: 2021-09-28

## 2021-09-29 PROBLEM — N83.209 UNSPECIFIED OVARIAN CYST, UNSPECIFIED SIDE: Chronic | Status: ACTIVE | Noted: 2021-09-28

## 2021-09-29 PROBLEM — Z87.898 PERSONAL HISTORY OF OTHER SPECIFIED CONDITIONS: Chronic | Status: ACTIVE | Noted: 2021-09-28

## 2021-09-29 PROBLEM — J30.89 OTHER ALLERGIC RHINITIS: Chronic | Status: ACTIVE | Noted: 2021-09-28

## 2021-09-29 PROBLEM — K25.9 GASTRIC ULCER, UNSPECIFIED AS ACUTE OR CHRONIC, WITHOUT HEMORRHAGE OR PERFORATION: Chronic | Status: ACTIVE | Noted: 2021-09-28

## 2021-09-29 PROBLEM — K64.9 UNSPECIFIED HEMORRHOIDS: Chronic | Status: ACTIVE | Noted: 2021-09-28

## 2021-09-29 PROBLEM — M79.7 FIBROMYALGIA: Chronic | Status: ACTIVE | Noted: 2021-09-28

## 2021-09-29 PROBLEM — E66.01 MORBID (SEVERE) OBESITY DUE TO EXCESS CALORIES: Chronic | Status: ACTIVE | Noted: 2021-09-28

## 2021-09-29 PROBLEM — I10 ESSENTIAL (PRIMARY) HYPERTENSION: Chronic | Status: ACTIVE | Noted: 2021-09-28

## 2021-09-29 PROBLEM — R63.2 POLYPHAGIA: Chronic | Status: ACTIVE | Noted: 2021-09-28

## 2021-09-29 PROBLEM — R60.9 EDEMA, UNSPECIFIED: Chronic | Status: ACTIVE | Noted: 2021-09-28

## 2021-09-29 PROBLEM — F41.9 ANXIETY DISORDER, UNSPECIFIED: Chronic | Status: ACTIVE | Noted: 2021-09-28

## 2021-09-29 PROBLEM — B37.3 CANDIDIASIS OF VULVA AND VAGINA: Chronic | Status: ACTIVE | Noted: 2021-09-28

## 2021-10-01 ENCOUNTER — APPOINTMENT (OUTPATIENT)
Dept: OBGYN | Facility: CLINIC | Age: 36
End: 2021-10-01
Payer: MEDICARE

## 2021-10-01 VITALS
DIASTOLIC BLOOD PRESSURE: 75 MMHG | HEIGHT: 56 IN | BODY MASS INDEX: 63.89 KG/M2 | SYSTOLIC BLOOD PRESSURE: 123 MMHG | WEIGHT: 284 LBS | RESPIRATION RATE: 14 BRPM | HEART RATE: 75 BPM

## 2021-10-01 DIAGNOSIS — N83.292 OTHER OVARIAN CYST, LEFT SIDE: ICD-10-CM

## 2021-10-01 PROCEDURE — 99214 OFFICE O/P EST MOD 30 MIN: CPT

## 2021-10-01 NOTE — PHYSICAL EXAM
[Appropriately responsive] : appropriately responsive [Alert] : alert [No Acute Distress] : no acute distress [No Lymphadenopathy] : no lymphadenopathy [Regular Rate Rhythm] : regular rate rhythm [No Murmurs] : no murmurs [Clear to Auscultation B/L] : clear to auscultation bilaterally [Soft] : soft [Non-tender] : non-tender [Non-distended] : non-distended [No HSM] : No HSM [No Lesions] : no lesions [No Mass] : no mass [Oriented x3] : oriented x3 [FreeTextEntry6] : ENLARGED [FreeTextEntry7] : OBESE

## 2021-10-01 NOTE — PLAN
[FreeTextEntry1] : DISCUSSED THE RISKS OF THE PROCEDURE. ILLUSTRATION PROVIDED WHICH DETAILS THE SITE OF POSSIBLE COMPLICATIONS IN GYNECOLOGIC SURGERY. THIS INCLUDES RISK OF BOWEL INJURY, URETERAL INJURY, BLADDER INJURY, VASCULAR INJURY, BLEEDING, INFECTION AND POSSIBLE NEED FOR BLOOD TRANSFUSION AS LONG AS THERE IS NO Mandaen OBJECTION TO BLOOD OR BLOOD PRODUCTS.  RISK REDUCING MEASURES EXPLAINED TO THE PATIENT IN DETAIL. ALL QUESTIONS ASKED AND ANSWERED. BOWEL PREP AND ANALGESICS PRESCRIBED.

## 2021-10-03 ENCOUNTER — APPOINTMENT (OUTPATIENT)
Dept: DISASTER EMERGENCY | Facility: CLINIC | Age: 36
End: 2021-10-03

## 2021-10-04 LAB — SARS-COV-2 N GENE NPH QL NAA+PROBE: NOT DETECTED

## 2021-10-05 RX ORDER — FENTANYL CITRATE 50 UG/ML
50 INJECTION INTRAVENOUS
Refills: 0 | Status: DISCONTINUED | OUTPATIENT
Start: 2021-10-06 | End: 2021-10-06

## 2021-10-05 RX ORDER — OXYCODONE HYDROCHLORIDE 5 MG/1
10 TABLET ORAL ONCE
Refills: 0 | Status: DISCONTINUED | OUTPATIENT
Start: 2021-10-06 | End: 2021-10-06

## 2021-10-05 RX ORDER — SODIUM CHLORIDE 9 MG/ML
1000 INJECTION, SOLUTION INTRAVENOUS
Refills: 0 | Status: DISCONTINUED | OUTPATIENT
Start: 2021-10-06 | End: 2021-10-06

## 2021-10-06 ENCOUNTER — RESULT REVIEW (OUTPATIENT)
Age: 36
End: 2021-10-06

## 2021-10-06 ENCOUNTER — OUTPATIENT (OUTPATIENT)
Dept: INPATIENT UNIT | Facility: HOSPITAL | Age: 36
LOS: 1 days | Discharge: HOME | End: 2021-10-06
Payer: MEDICARE

## 2021-10-06 ENCOUNTER — APPOINTMENT (OUTPATIENT)
Dept: OBGYN | Facility: HOSPITAL | Age: 36
End: 2021-10-06

## 2021-10-06 VITALS
HEIGHT: 57 IN | DIASTOLIC BLOOD PRESSURE: 96 MMHG | SYSTOLIC BLOOD PRESSURE: 140 MMHG | RESPIRATION RATE: 16 BRPM | TEMPERATURE: 99 F | OXYGEN SATURATION: 100 % | WEIGHT: 287.04 LBS | HEART RATE: 114 BPM

## 2021-10-06 VITALS
DIASTOLIC BLOOD PRESSURE: 61 MMHG | SYSTOLIC BLOOD PRESSURE: 120 MMHG | RESPIRATION RATE: 20 BRPM | TEMPERATURE: 98 F | HEART RATE: 105 BPM | OXYGEN SATURATION: 96 %

## 2021-10-06 DIAGNOSIS — Z98.890 OTHER SPECIFIED POSTPROCEDURAL STATES: Chronic | ICD-10-CM

## 2021-10-06 DIAGNOSIS — N83.292 OTHER OVARIAN CYST, LEFT SIDE: ICD-10-CM

## 2021-10-06 LAB — HCG SERPL-ACNC: <1 MIU/ML — SIGNIFICANT CHANGE UP

## 2021-10-06 PROCEDURE — 49587: CPT | Mod: AS

## 2021-10-06 PROCEDURE — 58661 LAPAROSCOPY REMOVE ADNEXA: CPT

## 2021-10-06 PROCEDURE — 49587: CPT

## 2021-10-06 PROCEDURE — 36415 COLL VENOUS BLD VENIPUNCTURE: CPT

## 2021-10-06 PROCEDURE — S2900: CPT

## 2021-10-06 PROCEDURE — 88104 CYTOPATH FL NONGYN SMEARS: CPT

## 2021-10-06 PROCEDURE — 84702 CHORIONIC GONADOTROPIN TEST: CPT

## 2021-10-06 PROCEDURE — S2900 ROBOTIC SURGICAL SYSTEM: CPT | Mod: NC

## 2021-10-06 PROCEDURE — 88302 TISSUE EXAM BY PATHOLOGIST: CPT | Mod: 26

## 2021-10-06 PROCEDURE — 58545 LAPAROSCOPIC MYOMECTOMY: CPT

## 2021-10-06 PROCEDURE — 88304 TISSUE EXAM BY PATHOLOGIST: CPT | Mod: 26

## 2021-10-06 PROCEDURE — 58661 LAPAROSCOPY REMOVE ADNEXA: CPT | Mod: AS

## 2021-10-06 PROCEDURE — 88305 TISSUE EXAM BY PATHOLOGIST: CPT | Mod: 26

## 2021-10-06 PROCEDURE — 88302 TISSUE EXAM BY PATHOLOGIST: CPT

## 2021-10-06 PROCEDURE — 58545 LAPAROSCOPIC MYOMECTOMY: CPT | Mod: AS

## 2021-10-06 PROCEDURE — 88304 TISSUE EXAM BY PATHOLOGIST: CPT

## 2021-10-06 PROCEDURE — 88305 TISSUE EXAM BY PATHOLOGIST: CPT

## 2021-10-06 PROCEDURE — 88104 CYTOPATH FL NONGYN SMEARS: CPT | Mod: 26

## 2021-10-06 RX ORDER — PANTOPRAZOLE SODIUM 20 MG/1
1 TABLET, DELAYED RELEASE ORAL
Qty: 0 | Refills: 0 | DISCHARGE

## 2021-10-06 RX ORDER — ATORVASTATIN CALCIUM 80 MG/1
1 TABLET, FILM COATED ORAL
Qty: 0 | Refills: 0 | DISCHARGE

## 2021-10-06 RX ORDER — CHOLECALCIFEROL (VITAMIN D3) 125 MCG
1 CAPSULE ORAL
Qty: 0 | Refills: 0 | DISCHARGE

## 2021-10-06 RX ORDER — HYDROMORPHONE HYDROCHLORIDE 2 MG/ML
0.5 INJECTION INTRAMUSCULAR; INTRAVENOUS; SUBCUTANEOUS
Refills: 0 | Status: DISCONTINUED | OUTPATIENT
Start: 2021-10-06 | End: 2021-10-06

## 2021-10-06 RX ORDER — FLUTICASONE PROPIONATE 50 MCG
1 SPRAY, SUSPENSION NASAL
Qty: 0 | Refills: 0 | DISCHARGE

## 2021-10-06 RX ORDER — ALBUTEROL 90 UG/1
2 AEROSOL, METERED ORAL
Qty: 0 | Refills: 0 | DISCHARGE

## 2021-10-06 RX ORDER — PROCHLORPERAZINE MALEATE 5 MG
10 TABLET ORAL ONCE
Refills: 0 | Status: COMPLETED | OUTPATIENT
Start: 2021-10-06 | End: 2021-10-06

## 2021-10-06 RX ORDER — FLUOXETINE HCL 10 MG
1 CAPSULE ORAL
Qty: 0 | Refills: 0 | DISCHARGE

## 2021-10-06 RX ORDER — FAMOTIDINE 10 MG/ML
1 INJECTION INTRAVENOUS
Qty: 0 | Refills: 0 | DISCHARGE

## 2021-10-06 RX ORDER — NYSTATIN CREAM 100000 [USP'U]/G
0 CREAM TOPICAL
Qty: 0 | Refills: 0 | DISCHARGE

## 2021-10-06 RX ORDER — BUDESONIDE, MICRONIZED 100 %
2 POWDER (GRAM) MISCELLANEOUS
Qty: 0 | Refills: 0 | DISCHARGE

## 2021-10-06 RX ORDER — LINACLOTIDE 145 UG/1
1 CAPSULE, GELATIN COATED ORAL
Qty: 0 | Refills: 0 | DISCHARGE

## 2021-10-06 RX ORDER — FLUTICASONE PROPIONATE AND SALMETEROL 50; 250 UG/1; UG/1
1 POWDER ORAL; RESPIRATORY (INHALATION)
Qty: 0 | Refills: 0 | DISCHARGE

## 2021-10-06 RX ORDER — TOPIRAMATE 25 MG
1 TABLET ORAL
Qty: 0 | Refills: 0 | DISCHARGE

## 2021-10-06 RX ORDER — SUMATRIPTAN SUCCINATE 4 MG/.5ML
1 INJECTION, SOLUTION SUBCUTANEOUS
Qty: 0 | Refills: 0 | DISCHARGE

## 2021-10-06 RX ORDER — ESCITALOPRAM OXALATE 10 MG/1
1 TABLET, FILM COATED ORAL
Qty: 0 | Refills: 0 | DISCHARGE

## 2021-10-06 RX ORDER — FLUVOXAMINE MALEATE 25 MG/1
1 TABLET ORAL
Qty: 0 | Refills: 0 | DISCHARGE

## 2021-10-06 RX ORDER — RISPERIDONE 4 MG/1
1 TABLET ORAL
Qty: 0 | Refills: 0 | DISCHARGE

## 2021-10-06 RX ORDER — PROPRANOLOL HCL 160 MG
1 CAPSULE, EXTENDED RELEASE 24HR ORAL
Qty: 0 | Refills: 0 | DISCHARGE

## 2021-10-06 RX ORDER — HYDROXYZINE HCL 10 MG
1 TABLET ORAL
Qty: 0 | Refills: 0 | DISCHARGE

## 2021-10-06 RX ORDER — OXYBUTYNIN CHLORIDE 5 MG
1 TABLET ORAL
Qty: 0 | Refills: 0 | DISCHARGE

## 2021-10-06 RX ORDER — AZELASTINE 137 UG/1
2 SPRAY, METERED NASAL
Qty: 0 | Refills: 0 | DISCHARGE

## 2021-10-06 RX ORDER — CARISOPRODOL 250 MG
1 TABLET ORAL
Qty: 0 | Refills: 0 | DISCHARGE

## 2021-10-06 RX ORDER — ONDANSETRON 8 MG/1
4 TABLET, FILM COATED ORAL ONCE
Refills: 0 | Status: COMPLETED | OUTPATIENT
Start: 2021-10-06 | End: 2021-10-06

## 2021-10-06 RX ADMIN — HYDROMORPHONE HYDROCHLORIDE 0.5 MILLIGRAM(S): 2 INJECTION INTRAMUSCULAR; INTRAVENOUS; SUBCUTANEOUS at 12:45

## 2021-10-06 RX ADMIN — FENTANYL CITRATE 50 MICROGRAM(S): 50 INJECTION INTRAVENOUS at 12:45

## 2021-10-06 RX ADMIN — ONDANSETRON 4 MILLIGRAM(S): 8 TABLET, FILM COATED ORAL at 12:15

## 2021-10-06 RX ADMIN — FENTANYL CITRATE 50 MICROGRAM(S): 50 INJECTION INTRAVENOUS at 12:25

## 2021-10-06 RX ADMIN — HYDROMORPHONE HYDROCHLORIDE 0.5 MILLIGRAM(S): 2 INJECTION INTRAMUSCULAR; INTRAVENOUS; SUBCUTANEOUS at 01:00

## 2021-10-06 RX ADMIN — Medication 10 MILLIGRAM(S): at 12:58

## 2021-10-06 RX ADMIN — FENTANYL CITRATE 50 MICROGRAM(S): 50 INJECTION INTRAVENOUS at 12:15

## 2021-10-06 RX ADMIN — FENTANYL CITRATE 50 MICROGRAM(S): 50 INJECTION INTRAVENOUS at 12:00

## 2021-10-06 NOTE — BRIEF OPERATIVE NOTE - NSICDXBRIEFPOSTOP_GEN_ALL_CORE_FT
POST-OP DIAGNOSIS:  Paratubal cyst 06-Oct-2021 11:27:43  Shantanu Garcia  Fibroid uterus 06-Oct-2021 11:28:06  Shantanu Garcia  Morbid obesity with BMI of 60.0-69.9, adult 06-Oct-2021 11:28:21  Shantanu Garcia  Umbilical hernia 06-Oct-2021 11:28:33  Shantanu Garcia

## 2021-10-06 NOTE — ASU PATIENT PROFILE, ADULT - NSICDXPASTMEDICALHX_GEN_ALL_CORE_FT
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)     Depression     Dermatofibroma of female breast     Edema     Environmental and seasonal allergies     Extrinsic asthma     Fibromyalgia     Gastric ulcer     H/O benign neoplasm of bladder     Hemorrhoids     History of chronic pain     HLD (hyperlipidemia)     HTN (hypertension)     Hypercholesterolemia with endogenous hyperglyceridemia     IBS (irritable bowel syndrome) constipation    Macromastia     Migraine headache     Morbid obesity     Morbid obesity     ALEJO (obstructive sleep apnea) mild pulm consult 9/2021    Ovarian cyst     Panic attack     Tachycardia     UTI (urinary tract infection)     Vaginal yeast infection

## 2021-10-06 NOTE — ASU DISCHARGE PLAN (ADULT/PEDIATRIC) - CARE PROVIDER_API CALL
Shantanu Garcia)  Obstetrics and Gynecology  [address] [place] [address] [id number]  Phone: [phone]  Fax: [phone]  Established Patient  Follow Up Time: 2 weeks

## 2021-10-06 NOTE — BRIEF OPERATIVE NOTE - NSICDXBRIEFPROCEDURE_GEN_ALL_CORE_FT
PROCEDURES:  Myomectomy, robot-assisted, laparoscopic, 1-4 myomas 06-Oct-2021 11:24:49  Shantanu Garcia  Salpingectomy, left 06-Oct-2021 11:25:08  Shantanu Garcia  Repair, hernia, umbilical, adult 06-Oct-2021 11:25:29  Shantanu Garcia

## 2021-10-13 DIAGNOSIS — N70.11 CHRONIC SALPINGITIS: ICD-10-CM

## 2021-10-13 DIAGNOSIS — K42.9 UMBILICAL HERNIA WITHOUT OBSTRUCTION OR GANGRENE: ICD-10-CM

## 2021-10-13 DIAGNOSIS — N83.8 OTHER NONINFLAMMATORY DISORDERS OF OVARY, FALLOPIAN TUBE AND BROAD LIGAMENT: ICD-10-CM

## 2021-10-13 DIAGNOSIS — K66.0 PERITONEAL ADHESIONS (POSTPROCEDURAL) (POSTINFECTION): ICD-10-CM

## 2021-10-13 DIAGNOSIS — J45.909 UNSPECIFIED ASTHMA, UNCOMPLICATED: ICD-10-CM

## 2021-10-13 DIAGNOSIS — E66.01 MORBID (SEVERE) OBESITY DUE TO EXCESS CALORIES: ICD-10-CM

## 2021-10-13 DIAGNOSIS — G47.33 OBSTRUCTIVE SLEEP APNEA (ADULT) (PEDIATRIC): ICD-10-CM

## 2021-10-13 DIAGNOSIS — F41.9 ANXIETY DISORDER, UNSPECIFIED: ICD-10-CM

## 2021-10-13 DIAGNOSIS — F42.9 OBSESSIVE-COMPULSIVE DISORDER, UNSPECIFIED: ICD-10-CM

## 2021-10-13 DIAGNOSIS — R19.04 LEFT LOWER QUADRANT ABDOMINAL SWELLING, MASS AND LUMP: ICD-10-CM

## 2021-10-13 DIAGNOSIS — K21.9 GASTRO-ESOPHAGEAL REFLUX DISEASE WITHOUT ESOPHAGITIS: ICD-10-CM

## 2021-10-13 DIAGNOSIS — D25.9 LEIOMYOMA OF UTERUS, UNSPECIFIED: ICD-10-CM

## 2021-10-18 ENCOUNTER — APPOINTMENT (OUTPATIENT)
Dept: OBGYN | Facility: CLINIC | Age: 36
End: 2021-10-18
Payer: MEDICARE

## 2021-10-18 VITALS
TEMPERATURE: 98 F | BODY MASS INDEX: 63.89 KG/M2 | SYSTOLIC BLOOD PRESSURE: 120 MMHG | HEIGHT: 56 IN | DIASTOLIC BLOOD PRESSURE: 70 MMHG | WEIGHT: 284 LBS

## 2021-10-18 PROBLEM — M24.139: Chronic | Status: ACTIVE | Noted: 2021-09-28

## 2021-10-18 PROBLEM — Z86.018 PERSONAL HISTORY OF OTHER BENIGN NEOPLASM: Chronic | Status: ACTIVE | Noted: 2021-09-28

## 2021-10-18 PROBLEM — K58.9 IRRITABLE BOWEL SYNDROME WITHOUT DIARRHEA: Chronic | Status: ACTIVE | Noted: 2021-09-28

## 2021-10-18 PROBLEM — R00.0 TACHYCARDIA, UNSPECIFIED: Chronic | Status: ACTIVE | Noted: 2021-09-28

## 2021-10-18 PROBLEM — G47.33 OBSTRUCTIVE SLEEP APNEA (ADULT) (PEDIATRIC): Chronic | Status: ACTIVE | Noted: 2021-09-28

## 2021-10-18 PROBLEM — Z92.29 PERSONAL HISTORY OF OTHER DRUG THERAPY: Chronic | Status: ACTIVE | Noted: 2021-09-28

## 2021-10-18 PROBLEM — N62 HYPERTROPHY OF BREAST: Chronic | Status: ACTIVE | Noted: 2021-09-28

## 2021-10-18 PROBLEM — D23.5 OTHER BENIGN NEOPLASM OF SKIN OF TRUNK: Chronic | Status: ACTIVE | Noted: 2021-09-28

## 2021-10-18 PROBLEM — E66.01 MORBID (SEVERE) OBESITY DUE TO EXCESS CALORIES: Chronic | Status: ACTIVE | Noted: 2021-09-28

## 2021-10-18 PROCEDURE — 99024 POSTOP FOLLOW-UP VISIT: CPT

## 2021-11-12 ENCOUNTER — APPOINTMENT (OUTPATIENT)
Dept: BARIATRICS/WEIGHT MGMT | Facility: CLINIC | Age: 36
End: 2021-11-12

## 2021-11-24 ENCOUNTER — APPOINTMENT (OUTPATIENT)
Dept: GASTROENTEROLOGY | Facility: CLINIC | Age: 36
End: 2021-11-24

## 2021-12-15 ENCOUNTER — APPOINTMENT (OUTPATIENT)
Dept: BARIATRICS/WEIGHT MGMT | Facility: CLINIC | Age: 36
End: 2021-12-15

## 2022-01-05 ENCOUNTER — APPOINTMENT (OUTPATIENT)
Dept: INTERNAL MEDICINE | Facility: CLINIC | Age: 37
End: 2022-01-05
Payer: MEDICARE

## 2022-01-05 VITALS
WEIGHT: 293 LBS | HEART RATE: 104 BPM | BODY MASS INDEX: 65.91 KG/M2 | SYSTOLIC BLOOD PRESSURE: 126 MMHG | DIASTOLIC BLOOD PRESSURE: 80 MMHG | OXYGEN SATURATION: 99 % | HEIGHT: 56 IN | TEMPERATURE: 98.3 F

## 2022-01-05 PROCEDURE — G0444 DEPRESSION SCREEN ANNUAL: CPT | Mod: 59

## 2022-01-05 PROCEDURE — G0439: CPT

## 2022-01-05 RX ORDER — ATORVASTATIN CALCIUM 20 MG/1
20 TABLET, FILM COATED ORAL DAILY
Qty: 90 | Refills: 1 | Status: DISCONTINUED | COMMUNITY
End: 2022-01-05

## 2022-01-05 RX ORDER — TRIAMCINOLONE ACETONIDE 0.1 %
0.1 CREAM (GRAM) TOPICAL
Refills: 0 | Status: ACTIVE | COMMUNITY

## 2022-01-05 NOTE — HEALTH RISK ASSESSMENT
[Never] : Never [Yes] : Yes [2 - 4 times a month (2 pts)] : 2-4 times a month (2 points) [1 or 2 (0 pts)] : 1 or 2 (0 points) [Never (0 pts)] : Never (0 points) [No] : In the past 12 months have you used drugs other than those required for medical reasons? No [1] : 1) Little interest or pleasure doing things for several days (1) [3] : 2) Feeling down, depressed, or hopeless for nearly every day (3) [Patient reported PAP Smear was normal] : Patient reported PAP Smear was normal [Patient reported colonoscopy was normal] : Patient reported colonoscopy was normal [PHQ-2 Positive] : PHQ-2 Positive [PHQ-9 Positive] : PHQ-9 Positive [I have developed a follow-up plan documented below in the note.] : I have developed a follow-up plan documented below in the note. [[id number]] : 4 [PapSmearDate] : 10/2021 [ColonoscopyDate] : 01/2018

## 2022-01-05 NOTE — HISTORY OF PRESENT ILLNESS
[FreeTextEntry1] : CPE [de-identified] : TERRI VALLECILLO is a 36 year F who comes in for an annual physical exam.\par Pt did find a doctor to do breast reduction  who works at [employer] but will need psych and pulm clearance. She recently had Sleep Study and was dx with ALEJO moderate and she cannot tolerate CPAP so she will see dentist for oral appliance. \par She sees Antonieta Violette, [facility] for cardiology and for tachycardia she was put medication, Verapamil.\par She brings in her blood work that was recently done with cardiology as well in November which showed normal CBC, BMP and TSH. Hemoglobin A1c elevated at 6.1. She also had lipid panel done which showed improvement (11/16/21 to 12/29/21 labs) after increasing  atorvastatin from 20 mg to 40 mg from total cholesterol 245 down to 205, triglycerides from 114 to 172, HDL from 47 to 38 and LDL from 175 down to 133.\par She notes continued trouble with binge eating and her psychiatrist has told her to followup with myself. She does have an appointment of obesity medicine. She does do group therapy and multiple therapist and psychologist followup through by and other programs. She is unable to do a nutritionist the program as she has other obligations.\par She notes having a rash around her groin as well as below her pannus again, mildly painful and malodorous.\par Patient denies any cp, sob,abdominal pain, nausea, vomiting, palpitations, fever, chills, constipation, diarrhea.\par

## 2022-01-05 NOTE — ASSESSMENT
[FreeTextEntry1] : 1.depression: Counting rides the patient, advised to followup with the programs with therapist and psychologist as well as psychiatrist. Continue all psychiatric medications.\par \par 2.health maintenance: Followup with GYN for mammogram and GI for colonoscopy. Recent blood work done with cardiology reviewed.\par Up-to-date with vaccinations\par Patient counseled regarding recommendations for vaccines, seat belt safety, diet and exercise and all preventative screening.\par \par 3.morbid obesity: Discussed following up with weight management as she is unable to see nutrition at this time. Also discussed following up with mental health regarding binge eating disorder.\par \par 4.candidiasis: Advised to start a nystatin powder and discussed preventative measures.\par \par 5.tachycardia: Followup with cardiology and continue on Toprol-XL and verapamil.\par \par 6.hyperlipidemia: Reassured patient that higher dose of her sent to 40 mg has really improved her lipid panel and to continue on this dose and followup with cardiology for repeat labs in 6 weeks.\par Patient advised on low cholesterol diet-decrease in white carbs and exercise 150 minutes per week.\par \par 7.enlarged breasts: Followup with breast surgery regarding breast reduction and return for medical clearance. Advised to have pulmonary, psychiatric and cardiology clearance prior to medical clearance.

## 2022-01-17 ENCOUNTER — APPOINTMENT (OUTPATIENT)
Dept: OBGYN | Facility: CLINIC | Age: 37
End: 2022-01-17

## 2022-01-19 ENCOUNTER — APPOINTMENT (OUTPATIENT)
Dept: BARIATRICS/WEIGHT MGMT | Facility: CLINIC | Age: 37
End: 2022-01-19
Payer: MEDICARE

## 2022-01-19 VITALS
HEIGHT: 57 IN | HEART RATE: 120 BPM | BODY MASS INDEX: 62.56 KG/M2 | WEIGHT: 290 LBS | OXYGEN SATURATION: 99 % | DIASTOLIC BLOOD PRESSURE: 80 MMHG | SYSTOLIC BLOOD PRESSURE: 130 MMHG

## 2022-01-19 DIAGNOSIS — Z01.818 ENCOUNTER FOR OTHER PREPROCEDURAL EXAMINATION: ICD-10-CM

## 2022-01-19 DIAGNOSIS — M54.9 DORSALGIA, UNSPECIFIED: ICD-10-CM

## 2022-01-19 DIAGNOSIS — Z09 ENCOUNTER FOR FOLLOW-UP EXAMINATION AFTER COMPLETED TREATMENT FOR CONDITIONS OTHER THAN MALIGNANT NEOPLASM: ICD-10-CM

## 2022-01-19 DIAGNOSIS — Z20.822 ENCOUNTER FOR PREPROCEDURAL LABORATORY EXAMINATION: ICD-10-CM

## 2022-01-19 DIAGNOSIS — Z01.811 ENCOUNTER FOR PREPROCEDURAL RESPIRATORY EXAMINATION: ICD-10-CM

## 2022-01-19 DIAGNOSIS — Z01.812 ENCOUNTER FOR PREPROCEDURAL LABORATORY EXAMINATION: ICD-10-CM

## 2022-01-19 DIAGNOSIS — G43.909 MIGRAINE, UNSPECIFIED, NOT INTRACTABLE, W/OUT STATUS MIGRAINOSUS: ICD-10-CM

## 2022-01-19 PROCEDURE — 99205 OFFICE O/P NEW HI 60 MIN: CPT

## 2022-01-20 NOTE — REVIEW OF SYSTEMS
[Patient Intake Form Reviewed] : Patient intake form was reviewed [Negative] : Allergic/Immunologic [MED-ROS-Cons-FT] : chas welsh [MED-ROS-Resp-FT] : sob with exertion [MED-ROS-Musclo-FT] : pain "everywhere" [MED-ROS-Neuro-FT] : headaches [MED-ROS-Psych-FT] : ocd, bipolar, anxiety, depression

## 2022-01-20 NOTE — PHYSICAL EXAM
[Obese, well nourished, in no acute distress] : obese, well nourished, in no acute distress [Normal] : RRR, normal S1S2, no murmurs, rubs, gallops [de-identified] : mild le swelling [de-identified] : +oversized protruding breasts  [de-identified] : deferred

## 2022-01-20 NOTE — ASSESSMENT
[FreeTextEntry1] : 36 y.o female with Class 3 obesity BMI 65, HLD, GERD, OCD, binge-eating d/o presents for weight management.\par \par - much of the visit was listening to patient's story, has PTSD and not controlled, difficulty coping with secondary anxiety.  Does not have mindset to change habits but willing to start "mood journal" to document mood after meals on daily basis\par - will curbside psych for therapists that may help with ptsd/BED\par - in the past has had prediabetes, will start metformin, side effects discussed - might help constipation\par - patient likely poor candidate for wt management program until PTSD/anxiety symptoms improved\par - she has anxiety with anyone telling her what to eat, and admits she will continue to do what she is doing "self-destructing" - she would like to talk to a therapist to help with ptsd/anxiety and possible BED\par  - she would prefer in person visits "don’t want to talk on phone" but next time can do video conference, Sydnie bull okay with this. "45 minute drive" to clinic\par \par f/u 4 weeks - teb\par \par Bariatric surgery history: none\par Obesity co-morbidities:HLD\par Comorbidities improved or resolved:none\par Anti-obesity medications: metformin\par Obesity medication side effects:\par

## 2022-01-31 ENCOUNTER — RX RENEWAL (OUTPATIENT)
Age: 37
End: 2022-01-31

## 2022-02-07 ENCOUNTER — APPOINTMENT (OUTPATIENT)
Dept: DERMATOLOGY | Facility: CLINIC | Age: 37
End: 2022-02-07
Payer: MEDICARE

## 2022-02-07 DIAGNOSIS — D23.9 OTHER BENIGN NEOPLASM OF SKIN, UNSPECIFIED: ICD-10-CM

## 2022-02-07 PROCEDURE — 99213 OFFICE O/P EST LOW 20 MIN: CPT

## 2022-02-07 RX ORDER — FLUOCINOLONE ACETONIDE 0.11 MG/ML
0.01 OIL AURICULAR (OTIC)
Qty: 20 | Refills: 0 | Status: ACTIVE | COMMUNITY
Start: 2021-12-05

## 2022-02-07 RX ORDER — FAMOTIDINE 40 MG/1
40 TABLET, FILM COATED ORAL
Qty: 90 | Refills: 0 | Status: ACTIVE | COMMUNITY
Start: 2021-10-05

## 2022-02-07 RX ORDER — GLYCERIN ADULT
1.75 SUPPOSITORY, RECTAL RECTAL
Qty: 1 | Refills: 0 | Status: DISCONTINUED | COMMUNITY
Start: 2021-10-01 | End: 2022-02-07

## 2022-02-07 RX ORDER — ERENUMAB-AOOE 140 MG/ML
140 INJECTION, SOLUTION SUBCUTANEOUS
Qty: 1 | Refills: 11 | Status: DISCONTINUED | COMMUNITY
Start: 2020-08-24 | End: 2022-02-07

## 2022-02-07 RX ORDER — HYDROCORTISONE 25 MG/G
2.5 CREAM TOPICAL
Qty: 30 | Refills: 0 | Status: ACTIVE | COMMUNITY
Start: 2022-01-12

## 2022-02-07 RX ORDER — LORATADINE 10 MG/1
10 TABLET ORAL
Qty: 30 | Refills: 0 | Status: ACTIVE | COMMUNITY
Start: 2022-02-01

## 2022-02-07 RX ORDER — ACETAMINOPHEN AND CODEINE 300; 30 MG/1; MG/1
300-30 TABLET ORAL
Qty: 40 | Refills: 0 | Status: DISCONTINUED | COMMUNITY
Start: 2021-10-01 | End: 2022-02-07

## 2022-02-07 NOTE — ASSESSMENT
[FreeTextEntry1] : Therapeutic options and their risks and benefits; along with multiple diagnostic possibilities were discussed at length; risks and benefits of further study were discussed;\par \par lesion consistent with dermatofibroma, with anetoderma like changes; \par no suspicious features; \par \par lesion unchanged from prior measurement;\par No changes from prior photograph; \par \par check in another 6 mos;  t/c annual therafter;

## 2022-02-07 NOTE — HISTORY OF PRESENT ILLNESS
[de-identified] : Pt. c/o lesion on breast;  was biopsied by other physician;  \par recommended removal; \par lesion present since teens; no Sxs; \par has squeezed it, drained; \par dx with dermatofibroma as per prior Bx results from 2018 in [place]; \par Pt. states no changes last 6 mos; \par

## 2022-02-11 ENCOUNTER — APPOINTMENT (OUTPATIENT)
Dept: BARIATRICS/WEIGHT MGMT | Facility: CLINIC | Age: 37
End: 2022-02-11

## 2022-02-23 ENCOUNTER — RX RENEWAL (OUTPATIENT)
Age: 37
End: 2022-02-23

## 2022-02-28 ENCOUNTER — APPOINTMENT (OUTPATIENT)
Dept: OBGYN | Facility: CLINIC | Age: 37
End: 2022-02-28
Payer: MEDICARE

## 2022-02-28 VITALS
WEIGHT: 290 LBS | DIASTOLIC BLOOD PRESSURE: 80 MMHG | HEIGHT: 57 IN | TEMPERATURE: 98 F | BODY MASS INDEX: 62.56 KG/M2 | SYSTOLIC BLOOD PRESSURE: 120 MMHG

## 2022-02-28 DIAGNOSIS — B37.2 CANDIDIASIS OF SKIN AND NAIL: ICD-10-CM

## 2022-02-28 PROCEDURE — 99213 OFFICE O/P EST LOW 20 MIN: CPT

## 2022-02-28 NOTE — CHIEF COMPLAINT
[Urgent Visit] : Urgent Visit [FreeTextEntry1] : PT WITH RECURRENT INFECTION IN THE GROIN AND UNDER THE PANNUS

## 2022-03-19 ENCOUNTER — TRANSCRIPTION ENCOUNTER (OUTPATIENT)
Age: 37
End: 2022-03-19

## 2022-04-06 ENCOUNTER — APPOINTMENT (OUTPATIENT)
Dept: INTERNAL MEDICINE | Facility: CLINIC | Age: 37
End: 2022-04-06
Payer: MEDICARE

## 2022-04-06 VITALS
OXYGEN SATURATION: 99 % | HEIGHT: 57 IN | WEIGHT: 293 LBS | HEART RATE: 103 BPM | SYSTOLIC BLOOD PRESSURE: 118 MMHG | DIASTOLIC BLOOD PRESSURE: 72 MMHG | BODY MASS INDEX: 63.21 KG/M2 | TEMPERATURE: 98.8 F

## 2022-04-06 PROCEDURE — 99215 OFFICE O/P EST HI 40 MIN: CPT

## 2022-04-06 RX ORDER — AMOXICILLIN AND CLAVULANATE POTASSIUM 875; 125 MG/1; MG/1
875-125 TABLET, COATED ORAL
Qty: 20 | Refills: 0 | Status: DISCONTINUED | COMMUNITY
Start: 2022-01-26 | End: 2022-04-06

## 2022-04-06 RX ORDER — NYSTATIN 100000 1/G
100000 POWDER TOPICAL
Qty: 1 | Refills: 1 | Status: DISCONTINUED | COMMUNITY
Start: 2022-01-05 | End: 2022-04-06

## 2022-04-06 RX ORDER — NYSTATIN 100000 1/G
100000 POWDER TOPICAL
Qty: 1 | Refills: 1 | Status: DISCONTINUED | COMMUNITY
Start: 2021-06-01 | End: 2022-04-06

## 2022-04-08 LAB
ANION GAP SERPL CALC-SCNC: 13 MMOL/L
APTT BLD: 32 SEC
BASOPHILS # BLD AUTO: 0.1 K/UL
BASOPHILS NFR BLD AUTO: 0.9 %
BUN SERPL-MCNC: 13 MG/DL
CALCIUM SERPL-MCNC: 9.5 MG/DL
CHLORIDE SERPL-SCNC: 104 MMOL/L
CO2 SERPL-SCNC: 21 MMOL/L
CREAT SERPL-MCNC: 0.63 MG/DL
EGFR: 117 ML/MIN/1.73M2
EOSINOPHIL # BLD AUTO: 0.1 K/UL
EOSINOPHIL NFR BLD AUTO: 0.9 %
GLUCOSE SERPL-MCNC: 167 MG/DL
HCT VFR BLD CALC: 34.6 %
HGB BLD-MCNC: 10.8 G/DL
IMM GRANULOCYTES NFR BLD AUTO: 0.9 %
INR PPP: 0.99 RATIO
LYMPHOCYTES # BLD AUTO: 2.33 K/UL
LYMPHOCYTES NFR BLD AUTO: 19.9 %
MAN DIFF?: NORMAL
MCHC RBC-ENTMCNC: 27.7 PG
MCHC RBC-ENTMCNC: 31.2 GM/DL
MCV RBC AUTO: 88.7 FL
MONOCYTES # BLD AUTO: 0.72 K/UL
MONOCYTES NFR BLD AUTO: 6.2 %
NEUTROPHILS # BLD AUTO: 8.32 K/UL
NEUTROPHILS NFR BLD AUTO: 71.2 %
PLATELET # BLD AUTO: 281 K/UL
POTASSIUM SERPL-SCNC: 4.1 MMOL/L
PT BLD: 11.7 SEC
RBC # BLD: 3.9 M/UL
RBC # FLD: 15.2 %
SODIUM SERPL-SCNC: 139 MMOL/L
WBC # FLD AUTO: 11.68 K/UL

## 2022-04-08 NOTE — ASSESSMENT
[Patient Optimized for Surgery] : Patient optimized for surgery [As per surgery] : as per surgery [FreeTextEntry4] : Patient is moderate to high risk for intermediate risk surgery.  \par Fax [facility] to Dr.Brian Salazar: [phone].\par \par Cardiology clearance done by Dr.Kristin Oakes 4/2022. \par \par Patient needs clearance from pulmonary medicine, pain medicine and psychiatry as well.\par WBC mildly elevated with hemoglobin stable, patient with diarrhea will obtain stool studies prior to her procedure.\par \par Patient advised to hold aspirin, all NSAIDs including Motrin, naproxen, Aleve, ibuprofen, Advil and fish oil 7 days prior to surgery.\par Adequate oxygen monitoring. DVT prophylaxis.\par Continue current medications as directed.\par Covid 19 nasopharyngeal swab per Surgery.\par \par \par

## 2022-04-08 NOTE — HISTORY OF PRESENT ILLNESS
[No Adverse Anesthesia Reaction] : no adverse anesthesia reaction in self or family member [(Patient denies any chest pain, claudication, dyspnea on exertion, orthopnea, palpitations or syncope)] : Patient denies any chest pain, claudication, dyspnea on exertion, orthopnea, palpitations or syncope [FreeTextEntry1] : Breast Reduction and Lift [FreeTextEntry2] : 4/29/22 [FreeTextEntry3] : Dr.Brian Salazar [FreeTextEntry4] : Ms. TERRI VALLECILLO is a 36 year F who comes in for a preoperative evaluation.\par Pt with hx of morbid obesity, ALEJO, anxiety disorder, depression, HLD, GERD, asthma comes in for preop visit for planned breast reduction and breast lift and nipple excision at [facility]. \par She had cardiac clearance yesterday with Dr.Kristin Oakes at [facility], has pulm  on 4/13/22 and had PFT and CXR done recently which she will f/u with results. \par Pt notes diarrhea for over a week, loose watery stool, many times a day, no black or bloody stool. \par She continues to have yeast infection on/off and seen gyn for treatment. \par Patient denies any cp, sob,abdominal pain, nausea, vomiting, palpitations, fever, chills, constipation.\par \par Anesthesia History: Ms. TERRI VALLECILLO has had no adverse effects to anesthesia in the past. \par \par Functional Capacity-Walking 1-2 blocks or climbing stairs symptoms: Ms. TERRI VALLECILLO denies any symptoms of chest pain, GALEANA, SOB or palpitations.\par

## 2022-04-08 NOTE — RESULTS/DATA
[de-identified] : wnl, wbc mildly high 11.68 and hb stable at 10.8. [de-identified] : wnl [de-identified] : per pulm [de-identified] : wnl [de-identified] : EKG: sinus tachycardia at 108 bpm, no ST-T changes.\par  [de-identified] : EST: per cardio no evidence of ischemia 4/2022.

## 2022-04-11 ENCOUNTER — NON-APPOINTMENT (OUTPATIENT)
Age: 37
End: 2022-04-11

## 2022-04-11 RX ORDER — LINACLOTIDE 145 UG/1
145 CAPSULE, GELATIN COATED ORAL
Refills: 0 | Status: DISCONTINUED | COMMUNITY
End: 2022-04-11

## 2022-04-11 RX ORDER — FLUTICASONE PROPIONATE AND SALMETEROL XINAFOATE 230; 21 UG/1; UG/1
230-21 AEROSOL, METERED RESPIRATORY (INHALATION) TWICE DAILY
Qty: 12 | Refills: 0 | Status: DISCONTINUED | COMMUNITY
Start: 2017-04-03 | End: 2022-04-11

## 2022-04-11 RX ORDER — CLOTRIMAZOLE AND BETAMETHASONE DIPROPIONATE 10; .5 MG/G; MG/G
1-0.05 CREAM TOPICAL TWICE DAILY
Qty: 1 | Refills: 2 | Status: DISCONTINUED | COMMUNITY
Start: 2022-02-28 | End: 2022-04-11

## 2022-04-11 RX ORDER — FLUCONAZOLE 150 MG/1
150 TABLET ORAL
Qty: 2 | Refills: 0 | Status: DISCONTINUED | COMMUNITY
Start: 2021-11-18 | End: 2022-04-11

## 2022-04-11 RX ORDER — NYSTATIN 100000 U/G
100000 OINTMENT TOPICAL
Qty: 30 | Refills: 0 | Status: DISCONTINUED | COMMUNITY
Start: 2021-09-21 | End: 2022-04-11

## 2022-04-11 RX ORDER — VERAPAMIL HYDROCHLORIDE 120 MG/1
120 TABLET ORAL
Qty: 30 | Refills: 0 | Status: DISCONTINUED | COMMUNITY
Start: 2021-11-23 | End: 2022-04-11

## 2022-04-11 RX ORDER — METRONIDAZOLE 7.5 MG/G
GEL VAGINAL
Refills: 0 | Status: DISCONTINUED | COMMUNITY
End: 2022-04-11

## 2022-04-11 RX ORDER — NAPROXEN 500 MG/1
500 TABLET ORAL
Refills: 0 | Status: DISCONTINUED | COMMUNITY
End: 2022-04-11

## 2022-04-11 RX ORDER — SUMATRIPTAN 100 MG/1
100 TABLET, FILM COATED ORAL
Qty: 12 | Refills: 5 | Status: DISCONTINUED | COMMUNITY
End: 2022-04-11

## 2022-04-11 RX ORDER — RISPERIDONE 2 MG/1
2 TABLET ORAL
Refills: 0 | Status: DISCONTINUED | COMMUNITY
End: 2022-04-11

## 2022-04-11 RX ORDER — ESCITALOPRAM OXALATE 20 MG/1
20 TABLET, FILM COATED ORAL DAILY
Refills: 0 | Status: DISCONTINUED | COMMUNITY
End: 2022-04-11

## 2022-04-11 RX ORDER — HYDROCHLOROTHIAZIDE 25 MG/1
25 TABLET ORAL
Qty: 90 | Refills: 0 | Status: DISCONTINUED | COMMUNITY
Start: 2021-11-18 | End: 2022-04-11

## 2022-04-11 RX ORDER — NYSTATIN 100000 [USP'U]/G
100000 POWDER TOPICAL
Qty: 15 | Refills: 1 | Status: DISCONTINUED | COMMUNITY
Start: 2022-01-31 | End: 2022-04-11

## 2022-04-11 RX ORDER — FAMOTIDINE 20 MG/1
20 TABLET, FILM COATED ORAL
Refills: 0 | Status: DISCONTINUED | COMMUNITY
End: 2022-04-11

## 2022-04-11 RX ORDER — VERAPAMIL HYDROCHLORIDE 120 MG/1
120 TABLET ORAL
Refills: 0 | Status: DISCONTINUED | COMMUNITY
End: 2022-04-11

## 2022-04-11 RX ORDER — NYSTATIN 100000 1/G
100000 POWDER TOPICAL
Qty: 1 | Refills: 2 | Status: DISCONTINUED | COMMUNITY
Start: 2022-02-28 | End: 2022-04-11

## 2022-04-11 RX ORDER — RISPERIDONE 3 MG/1
3 TABLET, FILM COATED ORAL
Qty: 60 | Refills: 0 | Status: DISCONTINUED | COMMUNITY
Start: 2022-01-05 | End: 2022-04-11

## 2022-04-11 RX ORDER — PROPRANOLOL HYDROCHLORIDE 20 MG/1
20 TABLET ORAL
Qty: 60 | Refills: 0 | Status: DISCONTINUED | COMMUNITY
Start: 2021-03-23 | End: 2022-04-11

## 2022-04-11 RX ORDER — ETODOLAC 400 MG/1
400 TABLET, FILM COATED ORAL
Refills: 0 | Status: DISCONTINUED | COMMUNITY
End: 2022-04-11

## 2022-04-13 ENCOUNTER — APPOINTMENT (OUTPATIENT)
Dept: BARIATRICS/WEIGHT MGMT | Facility: CLINIC | Age: 37
End: 2022-04-13

## 2022-04-13 ENCOUNTER — APPOINTMENT (OUTPATIENT)
Dept: DERMATOLOGY | Facility: CLINIC | Age: 37
End: 2022-04-13

## 2022-04-13 LAB
C DIFF TOX GENS STL QL NAA+PROBE: NORMAL
CDIFF BY PCR: NOT DETECTED
DEPRECATED O AND P PREP STL: NORMAL
G LAMBLIA AG STL QL: NORMAL

## 2022-04-14 ENCOUNTER — APPOINTMENT (OUTPATIENT)
Dept: BARIATRICS/WEIGHT MGMT | Facility: CLINIC | Age: 37
End: 2022-04-14
Payer: MEDICARE

## 2022-04-14 LAB — BACTERIA STL CULT: NORMAL

## 2022-04-14 PROCEDURE — 99214 OFFICE O/P EST MOD 30 MIN: CPT | Mod: 95

## 2022-04-14 NOTE — ASSESSMENT
[FreeTextEntry1] : 36 y.o female with Class 3 obesity BMI 65, HLD, GERD, OCD, binge-eating d/o presents for weight management.\par \par - will trial Rybelsus, continue metformin once a day. discussed side effects, and incr as tolerated after 1 month\par - has appt with Dr Dong\par - , has PTSD and not controlled, difficulty coping with secondary anxiety.  Does not have mindset to change habits but willing to start "mood journal" to document mood after meals on daily basis\par - will curbside psych for therapists that may help with ptsd/BED\par - in the past has had prediabetes, will start metformin, side effects discussed - might help constipation\par - patient likely poor candidate for wt management program until PTSD/anxiety symptoms improved\par - discussed she would be a poor surgical candidate at this time\par - she has anxiety with anyone telling her what to eat, and admits she will continue to do what she is doing "self-destructing" - she would like to talk to a therapist to help with ptsd/anxiety and possible BED\par \par f/u 4 weeks - teb\par \par Bariatric surgery history: none\par Obesity co-morbidities:HLD\par Comorbidities improved or resolved:none\par Anti-obesity medications: metformin, rybelsus\par Obesity medication side effects: none\par

## 2022-04-14 NOTE — PHYSICAL EXAM
[Obese, well nourished, in no acute distress] : obese, well nourished, in no acute distress [Normal] : RRR, normal S1S2, no murmurs, rubs, gallops [de-identified] : mild le swelling [de-identified] : +oversized protruding breasts  [de-identified] : deferred
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
## 2022-04-14 NOTE — REVIEW OF SYSTEMS
[Negative] : Allergic/Immunologic [MED-ROS-Cons-FT] : fatigue, weakness [MED-ROS-Resp-FT] : sob with exertion [MED-ROS-Musclo-FT] : pain "everywhere" [MED-ROS-Neuro-FT] : headaches [MED-ROS-Psych-FT] : ocd, bipolar, anxiety, depression

## 2022-04-19 ENCOUNTER — APPOINTMENT (OUTPATIENT)
Dept: INTERNAL MEDICINE | Facility: CLINIC | Age: 37
End: 2022-04-19

## 2022-04-28 ENCOUNTER — APPOINTMENT (OUTPATIENT)
Dept: INTERNAL MEDICINE | Facility: CLINIC | Age: 37
End: 2022-04-28

## 2022-05-02 ENCOUNTER — OUTPATIENT (OUTPATIENT)
Dept: OUTPATIENT SERVICES | Facility: HOSPITAL | Age: 37
LOS: 1 days | End: 2022-05-02
Payer: MEDICARE

## 2022-05-02 DIAGNOSIS — Z98.890 OTHER SPECIFIED POSTPROCEDURAL STATES: Chronic | ICD-10-CM

## 2022-05-02 DIAGNOSIS — J96.00 ACUTE RESPIRATORY FAILURE, UNSPECIFIED WHETHER WITH HYPOXIA OR HYPERCAPNIA: ICD-10-CM

## 2022-05-02 PROCEDURE — 82803 BLOOD GASES ANY COMBINATION: CPT

## 2022-05-02 PROCEDURE — 36600 WITHDRAWAL OF ARTERIAL BLOOD: CPT

## 2022-05-03 ENCOUNTER — APPOINTMENT (OUTPATIENT)
Dept: INTERNAL MEDICINE | Facility: CLINIC | Age: 37
End: 2022-05-03

## 2022-05-03 DIAGNOSIS — J96.00 ACUTE RESPIRATORY FAILURE, UNSPECIFIED WHETHER WITH HYPOXIA OR HYPERCAPNIA: ICD-10-CM

## 2022-05-09 ENCOUNTER — APPOINTMENT (OUTPATIENT)
Dept: OBGYN | Facility: CLINIC | Age: 37
End: 2022-05-09

## 2022-05-10 ENCOUNTER — APPOINTMENT (OUTPATIENT)
Dept: OBGYN | Facility: CLINIC | Age: 37
End: 2022-05-10

## 2022-05-12 ENCOUNTER — APPOINTMENT (OUTPATIENT)
Dept: BARIATRICS/WEIGHT MGMT | Facility: CLINIC | Age: 37
End: 2022-05-12
Payer: MEDICARE

## 2022-05-12 VITALS — BODY MASS INDEX: 63.84 KG/M2 | WEIGHT: 293 LBS

## 2022-05-12 PROCEDURE — 99214 OFFICE O/P EST MOD 30 MIN: CPT | Mod: 95

## 2022-05-12 NOTE — REVIEW OF SYSTEMS
[Negative] : Cardiovascular [MED-ROS-Cons-FT] : fatigue, weakness [MED-ROS-Resp-FT] : sob with exertion [MED-ROS-Musclo-FT] : pain "everywhere" [MED-ROS-Neuro-FT] : headaches [MED-ROS-Psych-FT] : ocd, bipolar, anxiety, depression

## 2022-05-12 NOTE — ASSESSMENT
[FreeTextEntry1] : 36 y.o female with Class 3 obesity BMI 65, HLD, GERD, OCD, binge-eating d/o presents for weight management.\par \par - will incr Rybelsus to 7mg, continue metformin once a day. discussed side effects\par - has appt with Dr Dong in a few weeks\par - hasn't tried OA - could be part of her journey but managing anxiety would be first priority - currently not on any meds bc of insurance issues\par - has PTSD and not controlled, difficulty coping with secondary anxiety.  Does not have mindset to change habits but willing to start "mood journal" to document mood after meals on daily basis\par - will curbside psych for therapists that may help with ptsd/BED\par - in the past has had prediabetes, will start metformin, side effects discussed - might help constipation\par - patient likely poor candidate for wt management program until PTSD/anxiety symptoms improved\par - discussed she would be a poor surgical candidate at this time\par - she has anxiety with anyone telling her what to eat, and admits she will continue to do what she is doing "self-destructing" - she would like to talk to a therapist to help with ptsd/anxiety and possible BED\par \par f/u 4 weeks - teb\par \par Bariatric surgery history: none\par Obesity co-morbidities:HLD\par Comorbidities improved or resolved:none\par Anti-obesity medications: metformin, rybelsus\par Obesity medication side effects: none\par

## 2022-05-12 NOTE — PHYSICAL EXAM
[Obese, well nourished, in no acute distress] : obese, well nourished, in no acute distress [Normal] : RRR, normal S1S2, no murmurs, rubs, gallops [de-identified] : mild le swelling [de-identified] : +oversized protruding breasts  [de-identified] : deferred
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
## 2022-05-17 ENCOUNTER — RX RENEWAL (OUTPATIENT)
Age: 37
End: 2022-05-17

## 2022-05-18 ENCOUNTER — APPOINTMENT (OUTPATIENT)
Dept: INTERNAL MEDICINE | Facility: CLINIC | Age: 37
End: 2022-05-18
Payer: MEDICARE

## 2022-05-18 VITALS
OXYGEN SATURATION: 99 % | WEIGHT: 293 LBS | DIASTOLIC BLOOD PRESSURE: 82 MMHG | BODY MASS INDEX: 63.21 KG/M2 | TEMPERATURE: 98.6 F | HEART RATE: 117 BPM | SYSTOLIC BLOOD PRESSURE: 132 MMHG | HEIGHT: 57 IN

## 2022-05-18 DIAGNOSIS — K58.1 IRRITABLE BOWEL SYNDROME WITH CONSTIPATION: ICD-10-CM

## 2022-05-18 PROCEDURE — 99214 OFFICE O/P EST MOD 30 MIN: CPT

## 2022-05-19 ENCOUNTER — NON-APPOINTMENT (OUTPATIENT)
Age: 37
End: 2022-05-19

## 2022-05-19 LAB
BASOPHILS # BLD AUTO: 0.09 K/UL
BASOPHILS NFR BLD AUTO: 0.8 %
EOSINOPHIL # BLD AUTO: 0.14 K/UL
EOSINOPHIL NFR BLD AUTO: 1.2 %
HCT VFR BLD CALC: 36 %
HGB BLD-MCNC: 11.5 G/DL
IMM GRANULOCYTES NFR BLD AUTO: 1.6 %
LYMPHOCYTES # BLD AUTO: 2.94 K/UL
LYMPHOCYTES NFR BLD AUTO: 24.5 %
MAN DIFF?: NORMAL
MCHC RBC-ENTMCNC: 27.7 PG
MCHC RBC-ENTMCNC: 31.9 GM/DL
MCV RBC AUTO: 86.7 FL
MONOCYTES # BLD AUTO: 0.68 K/UL
MONOCYTES NFR BLD AUTO: 5.7 %
NEUTROPHILS # BLD AUTO: 7.96 K/UL
NEUTROPHILS NFR BLD AUTO: 66.2 %
PLATELET # BLD AUTO: 282 K/UL
RBC # BLD: 4.15 M/UL
RBC # FLD: 15.3 %
WBC # FLD AUTO: 12 K/UL

## 2022-05-20 NOTE — ASSESSMENT
[FreeTextEntry1] : 1.leukocytosis: Discussed with patient we will need to repeat CBC, may need to see otology prior to surgery.\par \par 2.candidiasis: Discussed preventative measures, refilled nystatin powder.\par \par 3.irritable bowel syndrome: Discussed following up with GI if she continues to have diarrhea.\par \par 4.question pulmonary hypertension: Patient will now need to have second opinion with another pulmonologist for clearance for surgery and she is being worked up further for pulmonary hypertension.

## 2022-05-20 NOTE — HISTORY OF PRESENT ILLNESS
[FreeTextEntry1] : FU [de-identified] : TERRI VALLECILLO is a 36 year F who comes in for a follow up visit.\par Pt said she saw  and had ct chest which showed Pulm htn? and was referred  Cardio on [address] and she will have cardiac cath in June to check pulmonary pressures/hypertension at [facility]. This will determine whether or not she will have her breast reduction surgery. She also was sent for bw as well by rheumatologist to r/o autoimmune testing. Her surgery for breast reduction has now been moved to 6/24/22 with . She is being referred to  as well for 2nd opinion for pulmonary clearance to r/o pulmonary hypertension. \par She continues to see Gi with [facility] for her diarrhea and may need another colonoscopy to r/o IBD. \par Her fungal rash has improved under breast but has moved from groin to rectal area.

## 2022-05-31 ENCOUNTER — APPOINTMENT (OUTPATIENT)
Dept: BARIATRICS/WEIGHT MGMT | Facility: CLINIC | Age: 37
End: 2022-05-31

## 2022-06-07 ENCOUNTER — RX RENEWAL (OUTPATIENT)
Age: 37
End: 2022-06-07

## 2022-06-07 ENCOUNTER — APPOINTMENT (OUTPATIENT)
Dept: INTERNAL MEDICINE | Facility: CLINIC | Age: 37
End: 2022-06-07

## 2022-06-13 ENCOUNTER — APPOINTMENT (OUTPATIENT)
Dept: BARIATRICS/WEIGHT MGMT | Facility: CLINIC | Age: 37
End: 2022-06-13
Payer: MEDICARE

## 2022-06-13 VITALS — BODY MASS INDEX: 63.41 KG/M2 | WEIGHT: 293 LBS

## 2022-06-13 DIAGNOSIS — F50.81 BINGE EATING DISORDER: ICD-10-CM

## 2022-06-13 PROCEDURE — 99443: CPT | Mod: 95

## 2022-06-13 RX ORDER — ORAL SEMAGLUTIDE 7 MG/1
7 TABLET ORAL DAILY
Qty: 30 | Refills: 0 | Status: DISCONTINUED | COMMUNITY
Start: 2022-04-14 | End: 2022-06-13

## 2022-06-13 NOTE — ASSESSMENT
[FreeTextEntry1] : 36 y.o female with Class 3 obesity BMI 65, HLD, GERD, OCD, binge-eating d/o presents for weight management.\par \par - now having binge eating and vomiting daily - bulimia symptoms. \par - D/C Rybelsus - dangerous to continue with daily vomiting.  Discussed eating 3 regular meals per day, starting with breakfast\par - continue cards work up, gi work up - patient shows medical condition worsening\par - wanting to know if there are inpatient facilities to treat eating disorder "I'm on a fixed income I need to know how much it would be" - we discussed this is a life and death situation\par - has appt with Dr Dong in a few weeks\par - has PTSD and not controlled, difficulty coping with secondary anxiety.  Does not have mindset to change habits but willing to start "mood journal" to document mood after meals on daily basis\par - will curbside psych for therapists that may help with ptsd/BED\par - in the past has had prediabetes, will start metformin, side effects discussed - might help constipation\par - patient is a very poor candidate for wt management program until PTSD/anxiety symptoms/severe eating disorder improved\par - discussed she would be a poor bariatric surgical candidate at this time\par - she has anxiety with anyone telling her what to eat, and admits she will continue to do what she is doing "self-destructing" - she would like to talk to a therapist to help with ptsd/anxiety and possible BED\par \par f/u 4 weeks - teb\par \par Bariatric surgery history: none\par Obesity co-morbidities:HLD\par Comorbidities improved or resolved:none\par Anti-obesity medications: metformin, rybelsus\par Obesity medication side effects: none\par

## 2022-06-13 NOTE — PHYSICAL EXAM
[Obese, well nourished, in no acute distress] : obese, well nourished, in no acute distress [Normal] : RRR, normal S1S2, no murmurs, rubs, gallops [de-identified] : mild le swelling [de-identified] : +oversized protruding breasts  [de-identified] : deferred

## 2022-06-13 NOTE — REVIEW OF SYSTEMS
[Negative] : Heme/Lymph [MED-ROS-Cons-FT] : fatigue, weakness [MED-ROS-Resp-FT] : sob with exertion [MED-ROS-Musclo-FT] : pain "everywhere" [MED-ROS-Neuro-FT] : headaches [MED-ROS-Psych-FT] : ocd, bipolar, anxiety, depression
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
## 2022-06-16 ENCOUNTER — RX RENEWAL (OUTPATIENT)
Age: 37
End: 2022-06-16

## 2022-06-16 RX ORDER — NYSTATIN 100000 [USP'U]/G
100000 CREAM TOPICAL TWICE DAILY
Qty: 30 | Refills: 1 | Status: ACTIVE | COMMUNITY
Start: 2021-06-11 | End: 1900-01-01

## 2022-06-24 ENCOUNTER — APPOINTMENT (OUTPATIENT)
Dept: OBGYN | Facility: CLINIC | Age: 37
End: 2022-06-24

## 2022-06-27 ENCOUNTER — APPOINTMENT (OUTPATIENT)
Dept: OBGYN | Facility: CLINIC | Age: 37
End: 2022-06-27

## 2022-06-29 ENCOUNTER — APPOINTMENT (OUTPATIENT)
Dept: INTERNAL MEDICINE | Facility: CLINIC | Age: 37
End: 2022-06-29

## 2022-06-29 VITALS
SYSTOLIC BLOOD PRESSURE: 132 MMHG | WEIGHT: 282 LBS | TEMPERATURE: 98.3 F | HEIGHT: 57 IN | BODY MASS INDEX: 60.84 KG/M2 | HEART RATE: 102 BPM | OXYGEN SATURATION: 99 % | DIASTOLIC BLOOD PRESSURE: 72 MMHG

## 2022-06-29 PROCEDURE — 99214 OFFICE O/P EST MOD 30 MIN: CPT

## 2022-06-29 NOTE — ASSESSMENT
[FreeTextEntry1] : 1.IBS with diarrhea/n/v: f/u with GI  at [place] on 7/12 as she needs upper EGD repeat and colonoscopy as well urgently. Call for new or worsening symptoms.\par Discussed signs and symptoms to warrant urgent evaluation with patient.\par Recent CBC stable.  Patient states she would like to have her breast reduction surgery done prior to EGD and colonoscopy.  Advise getting clearance from GI for her upcoming breast surgery.\par \par 2.leukocytosis: pt had blood work done CBC shows WBC at 10.2 in normal range now (seen on patient's phone).  Continue follow-up with hematology.\par \par 3.enlarged breast with morbid obesity: Patient would like to move forward with her breast reduction surgery, she has gotten pulmonary clearance on June 21 from  as well as cardiology on June 20 Antonieta Oakes, pt recent cardiac cath done too (all results viewed on pt's phone on pt portal with [facility]).\par \par Pulmonary and cardiology clearance already done last week, advised patient to follow-up with Dr. Salazar, breast surgeon to see when her surgery date is and to follow-up for medical clearance.\par \par 4.depression: Counseling prior to the patient, continue on BuSpar, Ativan, Luvox and Prozac and follow-up with therapist and psychiatrist.

## 2022-06-29 NOTE — HISTORY OF PRESENT ILLNESS
[FreeTextEntry1] : FU [de-identified] : TERRI VALLECILLO is a 36 year F who comes in for a follow up visit.\par Pt did see  about 2 weeks and said pt needed Ct/US of LE to r/o DVT, did have cardiac cath and id dnot have to go for 2nd opinion with pulm. Cardiac cath done on 6/15/22 by  showed normal LVEF and no concern for pulmonary hypertension. \par She did see rheumatology (used to see ) Lynette Stanley and had multiple blood tests and believes pt may had Crohns Disease as she has high ESR at 83 and continuous diarrhea. She was seeing GI  but would like 2nd opinion as she may need repeat upper EGD as she is vomiting daily with small "specks of blood" (not self induced per patient) due to coughing and colonoscopy due diarrhea to r/o crohns disease. She will see Dr.Ronni Mckinnon at [place] 7/12/22. Her last colonoscopy was 4 yrs in [place] and was normal per pt and was dx with IBS at that time and upper EGD showed GERD and gastric ulcers.\par She feels very weak and tried many times and frequently falls asleep during the daytime. She does have hx of mild ALEJO dx on home sleep study per pt last year but cannot tolerate CPAP due to claustrophobia and has not followed up with dentist for dental appliance. \par She did see hematologist  this month and was told wbc high due to autoimmune issue and so she did see rheum as stated above.

## 2022-06-30 ENCOUNTER — APPOINTMENT (OUTPATIENT)
Dept: INTERNAL MEDICINE | Facility: CLINIC | Age: 37
End: 2022-06-30

## 2022-07-11 LAB
ESTIMATED AVERAGE GLUCOSE: 134 MG/DL
HBA1C MFR BLD HPLC: 6.3 %

## 2022-07-12 ENCOUNTER — NON-APPOINTMENT (OUTPATIENT)
Age: 37
End: 2022-07-12

## 2022-07-25 ENCOUNTER — APPOINTMENT (OUTPATIENT)
Dept: BARIATRICS/WEIGHT MGMT | Facility: CLINIC | Age: 37
End: 2022-07-25

## 2022-08-01 ENCOUNTER — APPOINTMENT (OUTPATIENT)
Dept: BARIATRICS/WEIGHT MGMT | Facility: CLINIC | Age: 37
End: 2022-08-01

## 2022-08-01 VITALS — BODY MASS INDEX: 63.21 KG/M2 | WEIGHT: 293 LBS | HEIGHT: 57 IN

## 2022-08-01 PROCEDURE — 90791 PSYCH DIAGNOSTIC EVALUATION: CPT

## 2022-08-10 ENCOUNTER — APPOINTMENT (OUTPATIENT)
Dept: DERMATOLOGY | Facility: CLINIC | Age: 37
End: 2022-08-10

## 2022-08-15 ENCOUNTER — APPOINTMENT (OUTPATIENT)
Dept: RHEUMATOLOGY | Facility: CLINIC | Age: 37
End: 2022-08-15

## 2022-08-19 ENCOUNTER — APPOINTMENT (OUTPATIENT)
Dept: UROGYNECOLOGY | Facility: CLINIC | Age: 37
End: 2022-08-19

## 2022-08-23 ENCOUNTER — APPOINTMENT (OUTPATIENT)
Dept: INTERNAL MEDICINE | Facility: CLINIC | Age: 37
End: 2022-08-23

## 2022-08-23 VITALS
TEMPERATURE: 98.4 F | HEART RATE: 76 BPM | HEIGHT: 57 IN | BODY MASS INDEX: 63.21 KG/M2 | WEIGHT: 293 LBS | DIASTOLIC BLOOD PRESSURE: 84 MMHG | SYSTOLIC BLOOD PRESSURE: 122 MMHG | OXYGEN SATURATION: 99 %

## 2022-08-23 PROCEDURE — 99215 OFFICE O/P EST HI 40 MIN: CPT

## 2022-08-23 RX ORDER — LAMOTRIGINE 100 MG/1
100 TABLET ORAL DAILY
Refills: 0 | Status: ACTIVE | COMMUNITY

## 2022-08-24 ENCOUNTER — APPOINTMENT (OUTPATIENT)
Dept: BARIATRICS/WEIGHT MGMT | Facility: CLINIC | Age: 37
End: 2022-08-24

## 2022-08-24 DIAGNOSIS — F32.A DEPRESSION, UNSPECIFIED: ICD-10-CM

## 2022-08-24 DIAGNOSIS — R73.03 PREDIABETES.: ICD-10-CM

## 2022-08-24 DIAGNOSIS — F43.10 POST-TRAUMATIC STRESS DISORDER, UNSPECIFIED: ICD-10-CM

## 2022-08-24 PROCEDURE — 99443: CPT | Mod: 95

## 2022-08-24 NOTE — PHYSICAL EXAM
[Obese, well nourished, in no acute distress] : obese, well nourished, in no acute distress [Normal] : RRR, normal S1S2, no murmurs, rubs, gallops [de-identified] : mild le swelling [de-identified] : +oversized protruding breasts  [de-identified] : deferred
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
## 2022-08-24 NOTE — ASSESSMENT
[FreeTextEntry1] : 36 y.o female with Class 3 obesity BMI 65, HLD, GERD, OCD, binge-eating d/o presents for weight management.\par \par - binge eating and vomiting daily - bulimia symptoms. \par - in her current state will hold Rybelsus - dangerous to continue with daily/regular vomiting.  Discussed eating 3 regular meals per day, starting with breakfast\par - continue cards work up, gi work up - patient shows medical condition worsening\par - wanting to know if there are inpatient facilities to treat eating disorder "I'm on a fixed income I need to know how much it would be" - we discussed this is a life and death situation - and otherwise she understands she will continue to decondition\par - will make list of places she's called for ED programs\par - patient states PTSD, hoarding, OCD are "10 times worse now"\par - patient inquiring about inpatient PTSD programs \par - patient is a very poor candidate for wt management program until PTSD/anxiety symptoms/severe eating disorder improved\par - poor bariatric surgical candidate at this time\par - she has anxiety with anyone telling her what to eat, and admits she will continue to do what she is doing "self-destructing" - she would like to talk to a therapist to help with ptsd/anxiety and possible BED\par \par Patient agrees to hold off on follow ups at this time, will see Dr. Dong regarding other residential programs.  She states her psychiatrist doesn't have any other residential options to offer her\par \par Bariatric surgery history: none\par Obesity co-morbidities:HLD\par Comorbidities improved or resolved:none\par Anti-obesity medications: none\par Obesity medication side effects: none\par

## 2022-08-31 ENCOUNTER — NON-APPOINTMENT (OUTPATIENT)
Age: 37
End: 2022-08-31

## 2022-08-31 LAB
BASOPHILS # BLD AUTO: 0.09 K/UL
BASOPHILS NFR BLD AUTO: 0.8 %
EOSINOPHIL # BLD AUTO: 0.13 K/UL
EOSINOPHIL NFR BLD AUTO: 1.1 %
HCT VFR BLD CALC: 35.7 %
HGB BLD-MCNC: 10.9 G/DL
IMM GRANULOCYTES NFR BLD AUTO: 0.8 %
LYMPHOCYTES # BLD AUTO: 2.47 K/UL
LYMPHOCYTES NFR BLD AUTO: 20.9 %
MAN DIFF?: NORMAL
MCHC RBC-ENTMCNC: 27.1 PG
MCHC RBC-ENTMCNC: 30.5 GM/DL
MCV RBC AUTO: 88.8 FL
MONOCYTES # BLD AUTO: 0.58 K/UL
MONOCYTES NFR BLD AUTO: 4.9 %
NEUTROPHILS # BLD AUTO: 8.47 K/UL
NEUTROPHILS NFR BLD AUTO: 71.5 %
PLATELET # BLD AUTO: 267 K/UL
RBC # BLD: 4.02 M/UL
RBC # FLD: 15.4 %
WBC # FLD AUTO: 11.83 K/UL

## 2022-08-31 NOTE — HISTORY OF PRESENT ILLNESS
[No Adverse Anesthesia Reaction] : no adverse anesthesia reaction in self or family member [(Patient denies any chest pain, claudication, dyspnea on exertion, orthopnea, palpitations or syncope)] : Patient denies any chest pain, claudication, dyspnea on exertion, orthopnea, palpitations or syncope [FreeTextEntry1] : Breast Reduction and Lift [FreeTextEntry2] : 9/9/2022 [FreeTextEntry3] : Dr.Brian Salazar [FreeTextEntry4] : Ms. TERRI VALLECILLO is a 36 year F who comes in for a preoperative evaluation.\par Pt with hx of morbid obesity, ALEJO, anxiety disorder, depression, HLD, GERD, asthma comes in for preop visit for planned breast reduction and breast lift and nipple excision at [facility]. \par She had cardiac clearance on 8/12/22 with Dr. Heather Hillman, and pulm  in June 2022. \par \par Patient denies any cp, sob,abdominal pain, nausea, vomiting, palpitations, fever, chills, constipation.\par \par Anesthesia History: Ms. TERRI VALLECILLO has had no adverse effects to anesthesia in the past. \par \par Functional Capacity-Walking 1-2 blocks or climbing stairs symptoms: Ms. TERRI VALLECILLO denies any symptoms of chest pain, GALEANA, SOB or palpitations.\par

## 2022-08-31 NOTE — ASSESSMENT
[Patient Optimized for Surgery] : Patient optimized for surgery [As per surgery] : as per surgery [FreeTextEntry4] : Patient is moderate to high risk for intermediate risk surgery, pending CXR and cbc with diff per surgery. \par Fax MCA to Dr.Brian Salazar: [phone]\par \par Addendum 8/31/22: CXR shows mild dilatation of pulmonary artery seen on cta chest as well from 4/2022, stable at this time and cleared by pulmonary medicine. CBC with differential stable with chronic changes of high wbc and low hb/hct. \par \par Cardiology clearance done by , 8/12/22. Pulm clearance per . \par \par Patient needs clearance from pain medicine and psychiatry as well.\par \par Patient advised to hold aspirin, all NSAIDs including Motrin, naproxen, Aleve, ibuprofen, Advil and fish oil 7 days prior to surgery.\par Adequate oxygen monitoring. DVT prophylaxis.\par Continue current medications as directed.\par Covid 19 nasopharyngeal swab per Surgery.\par \par \par

## 2022-08-31 NOTE — RESULTS/DATA
[] : results reviewed [de-identified] : wnl, hb 10.9 , hct 35.7, wbc: 11.83 [de-identified] : wnl [de-identified] : sodium 137, potassium 4.5, chloride 101, bicarb 21, bun 12, creat 0.55, glucose 120 [de-identified] : stable, see addendum.  [de-identified] : EKG: NSR at 98 bpm, no ST-T changes, unchanged from previous ECG done at cardiology 8/12/22\par \par  [de-identified] : EST: per cardio no evidence of ischemia 4/2022. Cardiac cath: normal coronaries with mild pulm htn.

## 2022-09-06 ENCOUNTER — RX RENEWAL (OUTPATIENT)
Age: 37
End: 2022-09-06

## 2022-09-29 ENCOUNTER — APPOINTMENT (OUTPATIENT)
Dept: INTERNAL MEDICINE | Facility: CLINIC | Age: 37
End: 2022-09-29

## 2022-10-25 ENCOUNTER — RX RENEWAL (OUTPATIENT)
Age: 37
End: 2022-10-25

## 2022-10-26 ENCOUNTER — APPOINTMENT (OUTPATIENT)
Dept: UROGYNECOLOGY | Facility: CLINIC | Age: 37
End: 2022-10-26

## 2022-10-27 ENCOUNTER — APPOINTMENT (OUTPATIENT)
Dept: INTERNAL MEDICINE | Facility: CLINIC | Age: 37
End: 2022-10-27

## 2022-10-27 VITALS
SYSTOLIC BLOOD PRESSURE: 124 MMHG | BODY MASS INDEX: 58.9 KG/M2 | DIASTOLIC BLOOD PRESSURE: 62 MMHG | WEIGHT: 273 LBS | OXYGEN SATURATION: 99 % | HEIGHT: 57 IN | TEMPERATURE: 98.1 F | HEART RATE: 33 BPM

## 2022-10-27 DIAGNOSIS — Z23 ENCOUNTER FOR IMMUNIZATION: ICD-10-CM

## 2022-10-27 PROCEDURE — 99214 OFFICE O/P EST MOD 30 MIN: CPT

## 2022-10-27 RX ORDER — METFORMIN HYDROCHLORIDE 500 MG/1
500 TABLET, COATED ORAL
Qty: 60 | Refills: 1 | Status: DISCONTINUED | COMMUNITY
Start: 2022-01-19 | End: 2022-10-27

## 2022-10-27 RX ORDER — FUROSEMIDE 20 MG/1
20 TABLET ORAL
Qty: 30 | Refills: 0 | Status: COMPLETED | COMMUNITY
Start: 2022-10-03

## 2022-10-27 RX ORDER — BUSPIRONE HYDROCHLORIDE 30 MG/1
30 TABLET ORAL
Qty: 60 | Refills: 0 | Status: ACTIVE | COMMUNITY
Start: 2022-09-28

## 2022-10-27 RX ORDER — PRAZOSIN HYDROCHLORIDE 2 MG/1
2 CAPSULE ORAL
Qty: 30 | Refills: 0 | Status: ACTIVE | COMMUNITY
Start: 2022-09-28

## 2022-10-27 RX ORDER — SENNOSIDES 8.6 MG TABLETS 8.6 MG/1
8.6 TABLET ORAL
Qty: 60 | Refills: 0 | Status: ACTIVE | COMMUNITY
Start: 2022-08-30

## 2022-10-27 RX ORDER — FUROSEMIDE 80 MG/1
TABLET ORAL DAILY
Refills: 0 | Status: DISCONTINUED | COMMUNITY
End: 2022-10-27

## 2022-10-27 NOTE — HISTORY OF PRESENT ILLNESS
[FreeTextEntry1] : FU [de-identified] : TERRI VALLECILLO is a 36 year F who comes in for a follow up visit.\par Pt is s/p breast reduction on 9/9/22 with  at [facility] and notes her surgery went very well with no complications. She did see  weekly post op and has healed well and drains have since been removed. She was on prophylactic antibiotics and left side stitches are taking longer to heal. She will see him again next Thursday. \par She noticed after her breast reduction as she can better visualize her abdomen, bulging from mid abdomen, no pain in abdomen. \par She continues to have on/off diarrhea with alternating constipation and sees  GI and has upper EGD and colonoscopy in 1/2023. She has been on MiraLAX and linzess. \par She did see  cardio and will have holter monitor soon to see if she can come off meds, now of lasix post surgery.\par She will have sleep study in december with . \par [facility] [place] endocrine Dr.Sarah Bailon and is on Rybelsus 3 mg to help with weight management. \par Patient denies any cp, sob,abdominal pain, nausea, vomiting, palpitations, fever, chills.\par

## 2022-10-27 NOTE — ASSESSMENT
[FreeTextEntry1] : 1.status post breast reduction: Patient has lost over 20 pounds and is feeling much better with no complications with surgery.  She continues to heal of the left breast and will follow up with  next week.\par \par 2.  Diarrhea with constipation: Continue follow-up with GI and follow-up for upper endoscopy and colonoscopy.  Now on senna and Linzess as well.\par \par 3.cutaneous candidiasis: Discussed nystatin powder and to keep area clean and dry it and discussed preventative measures with her. Went over instructions and side effects of medication.\par \par 4.ventral hernia: Discussed diagnosis and treatment options.  Discussed general surgery referral Dr. Swanson and likely observation at this time as recently had surgery.\par \par 5.status post myomectomy: With left salpingectomy and umbilical hernia repair with OB/GYN in October of last year.  Follow-up with GYN.

## 2022-11-07 ENCOUNTER — APPOINTMENT (OUTPATIENT)
Dept: INTERNAL MEDICINE | Facility: CLINIC | Age: 37
End: 2022-11-07

## 2022-11-07 PROCEDURE — 90686 IIV4 VACC NO PRSV 0.5 ML IM: CPT

## 2022-11-07 PROCEDURE — G0008: CPT

## 2022-11-08 ENCOUNTER — APPOINTMENT (OUTPATIENT)
Dept: INTERNAL MEDICINE | Facility: CLINIC | Age: 37
End: 2022-11-08

## 2022-11-10 ENCOUNTER — APPOINTMENT (OUTPATIENT)
Dept: INTERNAL MEDICINE | Facility: CLINIC | Age: 37
End: 2022-11-10

## 2022-11-18 ENCOUNTER — APPOINTMENT (OUTPATIENT)
Dept: SURGERY | Facility: CLINIC | Age: 37
End: 2022-11-18

## 2022-11-18 VITALS
SYSTOLIC BLOOD PRESSURE: 124 MMHG | TEMPERATURE: 98.4 F | OXYGEN SATURATION: 97 % | DIASTOLIC BLOOD PRESSURE: 78 MMHG | BODY MASS INDEX: 58.9 KG/M2 | HEART RATE: 68 BPM | WEIGHT: 273 LBS | RESPIRATION RATE: 15 BRPM | HEIGHT: 57 IN

## 2022-11-18 PROCEDURE — 99204 OFFICE O/P NEW MOD 45 MIN: CPT

## 2022-11-18 NOTE — REVIEW OF SYSTEMS
[Fever] : no fever [Chills] : no chills [Eye Pain] : no eye pain [Nosebleeds] : no nosebleeds [Chest Pain] : no chest pain [Shortness Of Breath] : shortness of breath [Abdominal Pain] : abdominal pain [Convulsions] : no convulsions [Easy Bleeding] : no tendency for easy bleeding [Easy Bruising] : no tendency for easy bruising

## 2022-11-18 NOTE — REASON FOR VISIT
[Consultation] : a consultation visit [Family Member] : family member [FreeTextEntry1] : ventral hernia

## 2022-11-18 NOTE — PHYSICAL EXAM
[JVD] : no jugular venous distention  [Normal Breath Sounds] : Normal breath sounds [Normal Heart Sounds] : normal heart sounds [Abdomen Tenderness] : ~T ~M Abdominal tenderness [No HSM] : no hepatosplenomegaly [No Rash or Lesion] : No rash or lesion [Alert] : alert [Oriented to Person] : oriented to person [Oriented to Place] : oriented to place [Oriented to Time] : oriented to time [Calm] : calm [de-identified] : NAD [de-identified] : NC/AT PER [de-identified] : obese, + ventral hernia around umbilicus [de-identified] : no CVA tenderness [de-identified] : moves all 4 extr

## 2022-11-18 NOTE — HISTORY OF PRESENT ILLNESS
[de-identified] : Large ventral hernia. Occ pain with effort. No GI obstruction. Recently had breast reduction. Interested in elective hernia repair.

## 2022-11-18 NOTE — CONSULT LETTER
[Dear  ___] : Dear  [unfilled], [Consult Letter:] : I had the pleasure of evaluating your patient, [unfilled]. [Please see my note below.] : Please see my note below. [Consult Closing:] : Thank you very much for allowing me to participate in the care of this patient.  If you have any questions, please do not hesitate to contact me. [Sincerely,] : Sincerely, [FreeTextEntry3] : Wm Sky LAROSE

## 2022-11-23 ENCOUNTER — APPOINTMENT (OUTPATIENT)
Dept: INTERNAL MEDICINE | Facility: CLINIC | Age: 37
End: 2022-11-23

## 2022-11-30 ENCOUNTER — APPOINTMENT (OUTPATIENT)
Dept: INTERNAL MEDICINE | Facility: CLINIC | Age: 37
End: 2022-11-30

## 2022-11-30 VITALS
TEMPERATURE: 98.7 F | DIASTOLIC BLOOD PRESSURE: 74 MMHG | HEIGHT: 57 IN | SYSTOLIC BLOOD PRESSURE: 128 MMHG | OXYGEN SATURATION: 98 % | WEIGHT: 278 LBS | HEART RATE: 108 BPM | BODY MASS INDEX: 59.98 KG/M2

## 2022-11-30 DIAGNOSIS — B37.9 CANDIDIASIS, UNSPECIFIED: ICD-10-CM

## 2022-11-30 PROCEDURE — 99214 OFFICE O/P EST MOD 30 MIN: CPT

## 2022-11-30 RX ORDER — LORAZEPAM 1 MG/1
1 TABLET ORAL
Refills: 0 | Status: DISCONTINUED | COMMUNITY
Start: 2022-11-18 | End: 2022-11-30

## 2022-11-30 RX ORDER — ERGOCALCIFEROL 1.25 MG/1
1.25 MG CAPSULE, LIQUID FILLED ORAL
Qty: 24 | Refills: 0 | Status: ACTIVE | COMMUNITY
Start: 2022-11-08

## 2022-11-30 RX ORDER — POLYETHYLENE GLYCOL 3350 17 G/17G
17 POWDER, FOR SOLUTION ORAL
Qty: 30 | Refills: 0 | Status: COMPLETED | COMMUNITY
Start: 2022-08-30

## 2022-11-30 RX ORDER — MIRABEGRON 50 MG/1
50 TABLET, FILM COATED, EXTENDED RELEASE ORAL
Refills: 0 | Status: DISCONTINUED | COMMUNITY
End: 2022-11-30

## 2022-11-30 RX ORDER — LORAZEPAM 1 MG/1
1 TABLET ORAL DAILY
Refills: 0 | Status: ACTIVE | COMMUNITY

## 2022-11-30 RX ORDER — LANCETS 33 GAUGE
EACH MISCELLANEOUS
Qty: 100 | Refills: 0 | Status: ACTIVE | COMMUNITY
Start: 2022-09-07

## 2022-11-30 RX ORDER — KETOCONAZOLE 20 MG/G
2 CREAM TOPICAL
Qty: 60 | Refills: 0 | Status: ACTIVE | COMMUNITY
Start: 2022-11-25

## 2022-11-30 RX ORDER — FLUOXETINE HYDROCHLORIDE 40 MG/1
40 CAPSULE ORAL
Refills: 0 | Status: DISCONTINUED | COMMUNITY
End: 2022-11-30

## 2022-11-30 RX ORDER — MUPIROCIN 20 MG/G
2 OINTMENT TOPICAL
Qty: 22 | Refills: 0 | Status: ACTIVE | COMMUNITY
Start: 2022-08-15

## 2022-11-30 RX ORDER — METRONIDAZOLE 500 MG/1
500 TABLET ORAL
Qty: 14 | Refills: 0 | Status: COMPLETED | COMMUNITY
Start: 2022-11-10

## 2022-11-30 RX ORDER — SODIUM SULFATE, POTASSIUM SULFATE, MAGNESIUM SULFATE 17.5; 3.13; 1.6 G/ML; G/ML; G/ML
17.5-3.13-1.6 SOLUTION, CONCENTRATE ORAL
Qty: 354 | Refills: 0 | Status: COMPLETED | COMMUNITY
Start: 2022-07-12

## 2022-11-30 RX ORDER — BLOOD-GLUCOSE METER
W/DEVICE KIT MISCELLANEOUS
Qty: 1 | Refills: 0 | Status: ACTIVE | COMMUNITY
Start: 2022-11-04

## 2022-11-30 NOTE — HISTORY OF PRESENT ILLNESS
[FreeTextEntry1] : FU [de-identified] : TERRI VALLECILLO is a 36 year F who comes in for a follow up visit.\par Pt went to see her endocrinologist Dr.Sarah Bailon at [place] and had bw earlier this month and hba1c was 6.9 and was started on jardiance 10 mg daily. \par Pt did see obgyn and was given Kenalog cream for yeast infection in groin/vaginal area and HC perianal cream as well. She was sent to dermatology and given Nizoral cream which is helping mildly.\par She is having migraine headaches and went to neuro optho and could be secondary to needing reading glasses. She did see neurologist and was given Nurtec as needed. She was advised to consider botox injection due to TMJ/teeth grinding. \par

## 2022-11-30 NOTE — ASSESSMENT
[FreeTextEntry1] : 1.PTSD: with anxiety and depression, with nightmares. Advised to f/u with psych to increase on Prazosin dose or Vistaril/hydroxyzine to help with insomnia. continue on buspar and Luvox. \par \par 2. .cutaneous candidiasis:f/u with derm for 2nd opinion and c/w nizoral cream. \par \par 3.ventral hernia: Discussed diagnosis and treatment options. recently seen by general surgery and will f/u for ct scan. \par \par 4.DM-2: f/u with endocrine, c/w jardiance 10 mg daily. Pt advised to keep diabetic diet, decrease carbs and increase dietary protein intake.\par Exercise as tolerated 3-4 times a week.\par \par

## 2022-12-15 ENCOUNTER — NON-APPOINTMENT (OUTPATIENT)
Age: 37
End: 2022-12-15

## 2023-01-05 ENCOUNTER — APPOINTMENT (OUTPATIENT)
Dept: INTERNAL MEDICINE | Facility: CLINIC | Age: 38
End: 2023-01-05

## 2023-01-09 ENCOUNTER — RX RENEWAL (OUTPATIENT)
Age: 38
End: 2023-01-09

## 2023-01-30 ENCOUNTER — NON-APPOINTMENT (OUTPATIENT)
Age: 38
End: 2023-01-30

## 2023-02-08 ENCOUNTER — APPOINTMENT (OUTPATIENT)
Dept: INTERNAL MEDICINE | Facility: CLINIC | Age: 38
End: 2023-02-08
Payer: MEDICARE

## 2023-02-08 VITALS
HEART RATE: 115 BPM | OXYGEN SATURATION: 99 % | TEMPERATURE: 98.7 F | HEIGHT: 57 IN | WEIGHT: 282 LBS | BODY MASS INDEX: 60.84 KG/M2

## 2023-02-08 VITALS — DIASTOLIC BLOOD PRESSURE: 70 MMHG | SYSTOLIC BLOOD PRESSURE: 132 MMHG

## 2023-02-08 DIAGNOSIS — F42.9 OBSESSIVE-COMPULSIVE DISORDER, UNSPECIFIED: ICD-10-CM

## 2023-02-08 DIAGNOSIS — R19.7 DIARRHEA, UNSPECIFIED: ICD-10-CM

## 2023-02-08 DIAGNOSIS — R11.2 NAUSEA WITH VOMITING, UNSPECIFIED: ICD-10-CM

## 2023-02-08 DIAGNOSIS — E78.2 MIXED HYPERLIPIDEMIA: ICD-10-CM

## 2023-02-08 DIAGNOSIS — F41.9 ANXIETY DISORDER, UNSPECIFIED: ICD-10-CM

## 2023-02-08 PROCEDURE — 99214 OFFICE O/P EST MOD 30 MIN: CPT

## 2023-02-08 RX ORDER — CEPHALEXIN 500 MG/1
500 CAPSULE ORAL
Qty: 28 | Refills: 0 | Status: DISCONTINUED | COMMUNITY
Start: 2022-09-29 | End: 2023-02-08

## 2023-02-08 RX ORDER — OXYBUTYNIN CHLORIDE 15 MG/1
15 TABLET, EXTENDED RELEASE ORAL
Qty: 90 | Refills: 0 | Status: DISCONTINUED | COMMUNITY
Start: 2022-11-03 | End: 2023-02-08

## 2023-02-08 NOTE — HISTORY OF PRESENT ILLNESS
[FreeTextEntry1] : FU [de-identified] : TERRI VALLECILLO is a 37 year F who comes in for a follow up visit.\par Pt did see surgeon Dr.William Swanson and had ct scan abdomen which showed ventral hernia was large but to observe for now. Pt will follow up with him in 2 weeks. \par She did see GI at [place]  and will have upper EGD and colonoscopy in march due to GERD and diarrhea and n/v. \par She did see [facility] and is on verapamil as well as Toprol for palpitations, she had Holter monitor and returned it and will see them in 2 weeks to review. \par Pt will see endocrinologist Dr.Sarah Bailon at [place] today and is on januvia and jardiance, recent hba1c 7.1, , TC: 213, , HDL 33, vit D 9.9 from 1/26/23. \par Pt did see derm Dr.Erin Francisco J Hillman and was given Kenalog cream for yeast infection in groin/vaginal area and HC perianal cream as well. \par She is having migraine headaches and went to neuro optho and could be secondary to needing reading glasses. She did see neurologist and was given Nurtec as needed. \par She joined a gym and is making changes with her diet.\par Patient denies any cp, sob,abdominal pain, nausea, vomiting, palpitations, fever, chills, constipation, diarrhea.\par \par

## 2023-02-08 NOTE — ASSESSMENT
[FreeTextEntry1] : 1.PTSD: with anxiety and depression, with nightmares. Advised to f/u with psych, c/w Prazosin, Vistaril/hydroxyzine to help with insomnia. continue on buspar 30 mg and Luvox. \par \par 2.Cutaneous candidiasis:f/u with derm  and continue all creams. \par \par 3.ventral hernia: Discussed diagnosis and treatment options. recently seen by general surgery and will f/u in 2 weeks. \par \par 4.DM-2: f/u with endocrine today, c/w jardiance 10 mg daily and januvia . Pt advised to keep diabetic diet, decrease carbs and increase dietary protein intake. recent hba1c 7.1. \par Exercise as tolerated 3-4 times a week.\par \par 5.Palpitations: f/u with cardio on holter monitor, c/w verapamil, toprol xl. \par

## 2023-02-14 ENCOUNTER — APPOINTMENT (OUTPATIENT)
Dept: DERMATOLOGY | Facility: CLINIC | Age: 38
End: 2023-02-14

## 2023-02-14 LAB — HBA1C MFR BLD HPLC: 7.1

## 2023-02-15 ENCOUNTER — APPOINTMENT (OUTPATIENT)
Dept: INTERNAL MEDICINE | Facility: CLINIC | Age: 38
End: 2023-02-15

## 2023-03-03 ENCOUNTER — APPOINTMENT (OUTPATIENT)
Dept: SURGERY | Facility: CLINIC | Age: 38
End: 2023-03-03
Payer: MEDICARE

## 2023-03-03 ENCOUNTER — APPOINTMENT (OUTPATIENT)
Dept: OBGYN | Facility: CLINIC | Age: 38
End: 2023-03-03

## 2023-03-03 VITALS
BODY MASS INDEX: 60.84 KG/M2 | WEIGHT: 282 LBS | SYSTOLIC BLOOD PRESSURE: 145 MMHG | OXYGEN SATURATION: 97 % | HEART RATE: 78 BPM | HEIGHT: 57 IN | DIASTOLIC BLOOD PRESSURE: 84 MMHG

## 2023-03-03 PROCEDURE — 99213 OFFICE O/P EST LOW 20 MIN: CPT

## 2023-03-03 NOTE — PLAN
[FreeTextEntry1] : Try weight loss - if unsuccessful, consider surgical weight loss options.\par total time > 22 minutes

## 2023-03-03 NOTE — PHYSICAL EXAM
[JVD] : no jugular venous distention  [Normal Breath Sounds] : Normal breath sounds [Normal Heart Sounds] : normal heart sounds [No HSM] : no hepatosplenomegaly [No Rash or Lesion] : No rash or lesion [Alert] : alert [Oriented to Person] : oriented to person [Oriented to Place] : oriented to place [Oriented to Time] : oriented to time [Calm] : calm [de-identified] : NAD [de-identified] : NC/AT PER [de-identified] : soft, umbilical hernia, obese [de-identified] : moves all 4 extr5/5

## 2023-03-03 NOTE — ASSESSMENT
[FreeTextEntry1] : + hernia - CT at [facility] reviewed on the patient's phone. Pt counselled to consider bariatric surgery and hernia repair as doing the hernia first would make subsequent abd surgery very difficult.

## 2023-03-03 NOTE — HISTORY OF PRESENT ILLNESS
[de-identified] : Was seen earlier for hernia and wt loss. Pt lost 20lbs, but regained the weight. Interested in hernia repair. No GI obstruction.

## 2023-03-24 ENCOUNTER — APPOINTMENT (OUTPATIENT)
Dept: OBGYN | Facility: CLINIC | Age: 38
End: 2023-03-24
Payer: MEDICARE

## 2023-03-24 VITALS
DIASTOLIC BLOOD PRESSURE: 60 MMHG | HEIGHT: 57 IN | HEART RATE: 74 BPM | RESPIRATION RATE: 15 BRPM | WEIGHT: 284 LBS | BODY MASS INDEX: 61.27 KG/M2 | SYSTOLIC BLOOD PRESSURE: 110 MMHG

## 2023-03-24 DIAGNOSIS — N76.0 ACUTE VAGINITIS: ICD-10-CM

## 2023-03-24 DIAGNOSIS — B96.89 ACUTE VAGINITIS: ICD-10-CM

## 2023-03-24 LAB
BILIRUB UR QL STRIP: NORMAL
CLARITY UR: CLEAR
COLLECTION METHOD: NORMAL
GLUCOSE UR-MCNC: NORMAL
HCG UR QL: 0.2 EU/DL
HGB UR QL STRIP.AUTO: NORMAL
KETONES UR-MCNC: NORMAL
LEUKOCYTE ESTERASE UR QL STRIP: NORMAL
NITRITE UR QL STRIP: NORMAL
PH UR STRIP: 7
PROT UR STRIP-MCNC: NORMAL
SP GR UR STRIP: 1.02

## 2023-03-24 PROCEDURE — 99215 OFFICE O/P EST HI 40 MIN: CPT

## 2023-03-24 PROCEDURE — 81003 URINALYSIS AUTO W/O SCOPE: CPT | Mod: QW

## 2023-03-24 NOTE — CHIEF COMPLAINT
[Urgent Visit] : Urgent Visit [FreeTextEntry1] : PT PRESENTS FOR URGENT VISIT. NOTES RECURRENT VAGINITIS

## 2023-03-24 NOTE — PHYSICAL EXAM
[Chaperone Present] : A chaperone was present in the examining room during all aspects of the physical examination [FreeTextEntry1] : J [Normal] : uterus [Discharge] : a  ~M vaginal discharge was present [Frederick] : yellow [Watery] : watery [No Bleeding] : there was no active vaginal bleeding [Uterine Adnexae] : were not tender and not enlarged

## 2023-03-24 NOTE — COUNSELING
[Nutrition] : nutrition [Exercise] : exercise [Bladder Hygiene] : bladder hygiene [Body Image] : body image

## 2023-04-04 ENCOUNTER — APPOINTMENT (OUTPATIENT)
Dept: INTERNAL MEDICINE | Facility: CLINIC | Age: 38
End: 2023-04-04

## 2023-04-13 NOTE — ED ADULT NURSE NOTE - NS_HUMANTRAFFICKQ1_ED_ALL_ED
Upon chart review, noticed patient did not yet read her message regarding oral supplementation. Called patient and left voicemail instructing her to please start oral Magnesium and Potassium as soon as she can and to get blood work tomorrow. Provided office phone number for call back purposes.   
No

## 2023-05-17 ENCOUNTER — APPOINTMENT (OUTPATIENT)
Dept: INTERNAL MEDICINE | Facility: CLINIC | Age: 38
End: 2023-05-17
Payer: MEDICARE

## 2023-05-17 VITALS
BODY MASS INDEX: 60.84 KG/M2 | WEIGHT: 282 LBS | TEMPERATURE: 98.5 F | DIASTOLIC BLOOD PRESSURE: 82 MMHG | HEIGHT: 57 IN | SYSTOLIC BLOOD PRESSURE: 128 MMHG | HEART RATE: 113 BPM | OXYGEN SATURATION: 97 %

## 2023-05-17 DIAGNOSIS — G47.33 OBSTRUCTIVE SLEEP APNEA (ADULT) (PEDIATRIC): ICD-10-CM

## 2023-05-17 DIAGNOSIS — F32.2 MAJOR DEPRESSIVE DISORDER, SINGLE EPISODE, SEVERE W/OUT PSYCHOTIC FEATURES: ICD-10-CM

## 2023-05-17 PROCEDURE — 99214 OFFICE O/P EST MOD 30 MIN: CPT

## 2023-05-17 RX ORDER — ATORVASTATIN CALCIUM 40 MG/1
40 TABLET, FILM COATED ORAL
Refills: 0 | Status: DISCONTINUED | COMMUNITY
End: 2023-05-17

## 2023-05-17 RX ORDER — LAMOTRIGINE 25 MG/1
25 TABLET ORAL
Qty: 60 | Refills: 0 | Status: DISCONTINUED | COMMUNITY
Start: 2022-07-13 | End: 2023-05-17

## 2023-05-17 RX ORDER — DEXAMETHASONE SODIUM PHOSPHATE 0.1 %
0.1 DROPS OPHTHALMIC (EYE)
Refills: 0 | Status: DISCONTINUED | COMMUNITY
End: 2023-05-17

## 2023-05-17 RX ORDER — DULAGLUTIDE 1.5 MG/.5ML
1.5 INJECTION, SOLUTION SUBCUTANEOUS
Refills: 0 | Status: DISCONTINUED | COMMUNITY
Start: 2023-03-03 | End: 2023-05-17

## 2023-05-17 RX ORDER — AMMONIUM LACTATE 12 %
12 CREAM (GRAM) TOPICAL
Qty: 140 | Refills: 0 | Status: DISCONTINUED | COMMUNITY
Start: 2021-12-15 | End: 2023-05-17

## 2023-05-17 RX ORDER — HYDROXYZINE PAMOATE 50 MG/1
50 CAPSULE ORAL
Refills: 0 | Status: DISCONTINUED | COMMUNITY
End: 2023-05-17

## 2023-05-17 RX ORDER — SUMATRIPTAN SUCCINATE 25 MG/1
25 TABLET, FILM COATED ORAL
Refills: 0 | Status: DISCONTINUED | COMMUNITY
End: 2023-05-17

## 2023-05-17 RX ORDER — OXYBUTYNIN CHLORIDE 10 MG/1
10 TABLET, EXTENDED RELEASE ORAL
Refills: 0 | Status: DISCONTINUED | COMMUNITY
End: 2023-05-17

## 2023-05-17 RX ORDER — BEPOTASTINE BESILATE 15 MG/ML
1.5 SOLUTION/ DROPS OPHTHALMIC
Qty: 5 | Refills: 0 | Status: DISCONTINUED | COMMUNITY
Start: 2021-09-01 | End: 2023-05-17

## 2023-05-17 RX ORDER — MELOXICAM 15 MG/1
15 TABLET ORAL
Qty: 30 | Refills: 2 | Status: DISCONTINUED | COMMUNITY
Start: 2020-08-03 | End: 2023-05-17

## 2023-05-17 NOTE — HISTORY OF PRESENT ILLNESS
[FreeTextEntry1] : FU [de-identified] : TERRI VALLECILLO is a 37 year F who comes in for a follow up visit.\par Pt had upper EGD and colonoscopy done with  5/10/23 which showed esophagitis, polyp which was removed with biopsy negative for h.pylori.\par She had labs done on 5/4/23 with CBC showing high wbc at 14.5, TFTs normal, hba1c at 7.0, CMP stable, \par Pt will meet Dr Zenon Miller with [place] for second opinion of ventral hernia. \par She did see GI at [place]  and will have upper EGD and colonoscopy in march due to GERD and diarrhea and n/v. \par Patient denies any cp, sob,abdominal pain, nausea, vomiting, palpitations, fever, chills, constipation, diarrhea.\par \par

## 2023-05-17 NOTE — ASSESSMENT
[FreeTextEntry1] : 1.leukocytosis: With continued elevated WBC.  Continue follow-up with heme-onc.\par \par 2.Cutaneous candidiasis:f/u with derm  and continue all creams. \par \par 3.ventral hernia: Discussed diagnosis and treatment options. recently seen by general surgery and with new surgeon for second opinion.\par \par 4.DM-2: f/u with endocrine and medications updated.  Recent hemoglobin A1c at 7.0.\par Exercise as tolerated 3-4 times a week.\par \par \par

## 2023-05-18 RX ORDER — FLUVOXAMINE MALEATE 150 MG/1
150 CAPSULE, EXTENDED RELEASE ORAL DAILY
Refills: 0 | Status: DISCONTINUED | COMMUNITY
End: 2023-05-18

## 2023-05-25 ENCOUNTER — APPOINTMENT (OUTPATIENT)
Dept: DERMATOLOGY | Facility: CLINIC | Age: 38
End: 2023-05-25

## 2023-06-06 LAB — 25(OH)D3 SERPL-MCNC: 8.9 NG/ML

## 2023-06-09 RX ORDER — MULTIVIT-MIN/FOLIC/VIT K/LYCOP 400-300MCG
50 MCG TABLET ORAL DAILY
Refills: 0 | Status: ACTIVE | COMMUNITY

## 2023-06-19 ENCOUNTER — APPOINTMENT (OUTPATIENT)
Dept: OBGYN | Facility: CLINIC | Age: 38
End: 2023-06-19

## 2023-07-21 ENCOUNTER — APPOINTMENT (OUTPATIENT)
Dept: NEUROLOGY | Facility: CLINIC | Age: 38
End: 2023-07-21
Payer: MEDICARE

## 2023-07-21 VITALS
BODY MASS INDEX: 58.25 KG/M2 | WEIGHT: 270 LBS | DIASTOLIC BLOOD PRESSURE: 83 MMHG | HEIGHT: 57 IN | HEART RATE: 102 BPM | TEMPERATURE: 98.2 F | SYSTOLIC BLOOD PRESSURE: 134 MMHG

## 2023-07-21 DIAGNOSIS — R41.89 OTHER SYMPTOMS AND SIGNS INVOLVING COGNITIVE FUNCTIONS AND AWARENESS: ICD-10-CM

## 2023-07-21 PROCEDURE — 99214 OFFICE O/P EST MOD 30 MIN: CPT

## 2023-07-21 NOTE — PHYSICAL EXAM
[General Appearance - Alert] : alert [General Appearance - In No Acute Distress] : in no acute distress [Oriented To Time, Place, And Person] : oriented to person, place, and time [Impaired Insight] : insight and judgment were intact [Affect] : the affect was normal [Person] : oriented to person [Place] : oriented to place [Time] : oriented to time [Cranial Nerves Optic (II)] : visual acuity intact bilaterally,  visual fields full to confrontation, pupils equal round and reactive to light [Cranial Nerves Oculomotor (III)] : extraocular motion intact [Cranial Nerves Trigeminal (V)] : facial sensation intact symmetrically [Cranial Nerves Facial (VII)] : face symmetrical [Cranial Nerves Vestibulocochlear (VIII)] : hearing was intact bilaterally [Cranial Nerves Accessory (XI - Cranial And Spinal)] : head turning and shoulder shrug symmetric [Cranial Nerves Hypoglossal (XII)] : there was no tongue deviation with protrusion [Motor Tone] : muscle tone was normal in all four extremities [Motor Strength] : muscle strength was normal in all four extremities [No Muscle Atrophy] : normal bulk in all four extremities [Motor Handedness Right-Handed] : the patient is right hand dominant [Sensation Tactile Decrease] : light touch was intact [Proprioception] : proprioception was intact [Abnormal Walk] : normal gait [Balance] : balance was intact [2+] : Ankle jerk left 2+ [Paresis Pronator Drift Right-Sided] : no pronator drift on the right [Paresis Pronator Drift Left-Sided] : no pronator drift on the left [Motor Strength Upper Extremities Right] : strength was normal in the right upper extremity [Motor Strength Upper Extremities Left] : strength was normal in the left upper extremity [Romberg's Sign] : Romberg's sign was negtive [Past-pointing] : there was no past-pointing [Tremor] : no tremor present [Dysdiadochokinesia Bilaterally] : not present [Coordination - Dysmetria Impaired Finger-to-Nose Bilateral] : not present

## 2023-07-21 NOTE — DATA REVIEWED
[FreeTextEntry1] :  EXAM:  MR VENOGRAM HEAD W-W O CON  \par \par \par PROCEDURE DATE:  Apr 14 2016 \par \par INTERPRETATION:  INDICATION: Blurred vision, headache, rule out \par thrombosis.\par \par TECHNIQUE: MR venogram of the head was performed using 3-D phase contrast \par technique as well as postcontrast technique. 9 cc of Gadavist were \par administered, 1 cc was discarded.\par \par COMPARISON: None available.\par \par FINDINGS: The superior sagittal sinus, both sigmoid sinuses and jugular \par bulbs are patent. There is a focal area of stenosis along the right \par distal transverse sinus at its junction with the right sigmoid sinus. \par Smooth tapered narrowing along the junction of the left sigmoid and \par transverse sinuses is incidental. Occipital sinus noted, anatomic \par variant. The straight sinus, vein of Quique, internal cerebral veins and \par basal veins of Maryana are also patent.\par \par IMPRESSION: No dural venous sinus thrombosis. There is focal stenosis \par along the junction of the right transverse and sigmoid sinus.\par \par \par  \par \par \par KAELA BORDEN M.D., RADIOLOGY FELLOW\par This document has been electronically signed.\par SLAVA KIMBLE M.D., ATTENDING RADIOLOGIST\par This document has been electronically signed. Apr 14 2016  4:57P\par     \par   \par

## 2023-07-21 NOTE — ASSESSMENT
[FreeTextEntry1] : 37-year-old woman history of chronic migraine headaches, more than 15 times a month, poor response to other preventative therapy in the past, did not respond back in 2020 to Aimovig, but does not want to start any injectables.\par Reviewed and discussed treatment options.\par Plan: Start Qulipta 30 mg p.o. daily.\par Only back in 3 to 4 weeks, if no response or only partial, will increase to 60 mg p.o. daily thereafter.\par History of hypersomnia, sudden sleep attacks, questionable narcolepsy, is followed by pulmonology, sleep medicine.  Pending and repeat polysomnography.\par Advised will need also an MSLT study.\par We will order a 24-hour ambulatory EEG, rule out underlying seizures.\par Return to office, 2 to 3 months.

## 2023-07-21 NOTE — HISTORY OF PRESENT ILLNESS
[Chronic Headache] : chronic headache [Dizziness] : dizziness [Nausea] : nausea [Photophobia] : photophobia [Phonophobia] : phonophobia [Neck Pain] : neck pain [___ Times Per Week] : [unfilled] times each week [> 4 hours] : > 4 hours [stayed the same] : stayed the same [Stable] : The patient reports ~his/her~ symptoms since the last visit are stable [FreeTextEntry1] : 37 year old woman c/o frequent headaches almost daily, moderate to severe, associated with light and sound sensivity, nausea. Prescribed Nurtec but doesn’t work. Did respond to Aimovig in the past.\par Also c/o fatigue, daytime drowsiness, will fall asleep anytime, on a taxi, sitting at dinner. Being evaluated by pulmonology, an CPAP machine ordered.\par

## 2023-07-25 ENCOUNTER — APPOINTMENT (OUTPATIENT)
Dept: INTERNAL MEDICINE | Facility: CLINIC | Age: 38
End: 2023-07-25

## 2023-08-10 ENCOUNTER — APPOINTMENT (OUTPATIENT)
Dept: DERMATOLOGY | Facility: CLINIC | Age: 38
End: 2023-08-10

## 2023-08-10 ENCOUNTER — APPOINTMENT (OUTPATIENT)
Dept: SURGERY | Facility: CLINIC | Age: 38
End: 2023-08-10
Payer: MEDICARE

## 2023-08-10 VITALS
DIASTOLIC BLOOD PRESSURE: 79 MMHG | HEIGHT: 57 IN | WEIGHT: 272 LBS | HEART RATE: 92 BPM | SYSTOLIC BLOOD PRESSURE: 139 MMHG | RESPIRATION RATE: 15 BRPM | BODY MASS INDEX: 58.68 KG/M2

## 2023-08-10 PROCEDURE — 99213 OFFICE O/P EST LOW 20 MIN: CPT

## 2023-08-10 RX ORDER — SULFASALAZINE 500 MG/1
500 TABLET ORAL TWICE DAILY
Refills: 0 | Status: ACTIVE | COMMUNITY

## 2023-08-10 NOTE — HISTORY OF PRESENT ILLNESS
[de-identified] : 38 yo female with morbid obesity. Weight down 10 lbs. Interested in elective repair.

## 2023-08-10 NOTE — PHYSICAL EXAM
[JVD] : no jugular venous distention  [Normal Breath Sounds] : Normal breath sounds [Normal Heart Sounds] : normal heart sounds [Abdomen Tenderness] : ~T ~M Abdominal tenderness [No HSM] : no hepatosplenomegaly [No Rash or Lesion] : No rash or lesion [Alert] : alert [Oriented to Person] : oriented to person [Oriented to Place] : oriented to place [Oriented to Time] : oriented to time [Calm] : calm [de-identified] : NAD [de-identified] : obese, central hernia [de-identified] : moves all 4extr 5/5

## 2023-08-10 NOTE — PLAN
[FreeTextEntry1] : Elective repair with mesh. Pt to provide CT from [facility]. Total time > 22 minutes

## 2023-08-16 ENCOUNTER — APPOINTMENT (OUTPATIENT)
Dept: INTERNAL MEDICINE | Facility: CLINIC | Age: 38
End: 2023-08-16
Payer: MEDICARE

## 2023-08-16 DIAGNOSIS — R05.9 COUGH, UNSPECIFIED: ICD-10-CM

## 2023-08-16 DIAGNOSIS — J34.89 OTHER SPECIFIED DISORDERS OF NOSE AND NASAL SINUSES: ICD-10-CM

## 2023-08-16 PROCEDURE — 99214 OFFICE O/P EST MOD 30 MIN: CPT | Mod: 95

## 2023-08-16 NOTE — HISTORY OF PRESENT ILLNESS
[FreeTextEntry8] : Ms. TERRI COCUH is a 37 year F who comes in for an acute visit. Pt notes this weekend on Saturday with rhinorrhea and headache which then progressed to cough with yellow phlegm, sore throat. Her symptoms got worse yesterday and feels better today. Pt went to see ENT and dx with sinusitis and given rx of Augmentin but did not  rx yet.  Patient denies any chest pain, shortness of breath, headache, abdominal pain, nausea, vomiting, palpitations, constipation, diarrhea or loss of sense of taste/smell. Pt is having surgery for ventral hernia on 10/16/23 with  as hernia has grown and will see me for MCA on 9/27/23. She will need to cardio and pulm for clearance as well.

## 2023-08-16 NOTE — PHYSICAL EXAM
[TextEntry] : General: NAD, No use of accessory muscles for breathing. Speaking in full sentences. Neuro: aaox3 psych: normal affect.

## 2023-08-16 NOTE — ASSESSMENT
[FreeTextEntry1] : 1.Cough/HA/Rhinorrhea: advised to do home covid testing and if not avialble will order covid pcr for lab. Quarantine until results available.  Given rx for robitussin rx. Increase fluids, rest.  Take Tylenol 500 mg 1-2 tabs as needed every 8 hours for pain.  Call for new or worsening symptoms.  2.Ventral Hernia: scheduled for sx 10/16/23 with . Advised to call back with PST results. f/u next mo for MCA apt.

## 2023-08-22 ENCOUNTER — RX RENEWAL (OUTPATIENT)
Age: 38
End: 2023-08-22

## 2023-08-23 ENCOUNTER — RX RENEWAL (OUTPATIENT)
Age: 38
End: 2023-08-23

## 2023-09-07 ENCOUNTER — APPOINTMENT (OUTPATIENT)
Dept: NEUROLOGY | Facility: CLINIC | Age: 38
End: 2023-09-07

## 2023-09-22 ENCOUNTER — APPOINTMENT (OUTPATIENT)
Dept: NEUROLOGY | Facility: CLINIC | Age: 38
End: 2023-09-22
Payer: MEDICARE

## 2023-09-22 VITALS
WEIGHT: 270 LBS | SYSTOLIC BLOOD PRESSURE: 155 MMHG | HEART RATE: 112 BPM | BODY MASS INDEX: 58.25 KG/M2 | HEIGHT: 57 IN | TEMPERATURE: 97.8 F | DIASTOLIC BLOOD PRESSURE: 73 MMHG

## 2023-09-22 DIAGNOSIS — G47.10 HYPERSOMNIA, UNSPECIFIED: ICD-10-CM

## 2023-09-22 DIAGNOSIS — G43.909 MIGRAINE, UNSPECIFIED, NOT INTRACTABLE, W/OUT STATUS MIGRAINOSUS: ICD-10-CM

## 2023-09-22 PROCEDURE — 99215 OFFICE O/P EST HI 40 MIN: CPT

## 2023-09-22 RX ORDER — TOPIRAMATE 25 MG/1
25 TABLET, FILM COATED ORAL
Qty: 60 | Refills: 5 | Status: ACTIVE | COMMUNITY
Start: 1900-01-01 | End: 1900-01-01

## 2023-09-27 ENCOUNTER — APPOINTMENT (OUTPATIENT)
Dept: INTERNAL MEDICINE | Facility: CLINIC | Age: 38
End: 2023-09-27

## 2023-09-28 ENCOUNTER — APPOINTMENT (OUTPATIENT)
Dept: INTERNAL MEDICINE | Facility: CLINIC | Age: 38
End: 2023-09-28
Payer: MEDICARE

## 2023-09-28 VITALS
DIASTOLIC BLOOD PRESSURE: 82 MMHG | TEMPERATURE: 98.5 F | HEIGHT: 57 IN | WEIGHT: 268 LBS | OXYGEN SATURATION: 99 % | SYSTOLIC BLOOD PRESSURE: 126 MMHG | HEART RATE: 105 BPM | BODY MASS INDEX: 57.82 KG/M2

## 2023-09-28 DIAGNOSIS — D72.829 ELEVATED WHITE BLOOD CELL COUNT, UNSPECIFIED: ICD-10-CM

## 2023-09-28 DIAGNOSIS — K43.9 VENTRAL HERNIA W/OUT OBSTRUCTION OR GANGRENE: ICD-10-CM

## 2023-09-28 PROCEDURE — 99215 OFFICE O/P EST HI 40 MIN: CPT

## 2023-09-28 RX ORDER — HYDROCHLOROTHIAZIDE 25 MG/1
25 TABLET ORAL DAILY
Refills: 0 | Status: ACTIVE | COMMUNITY

## 2023-09-28 RX ORDER — ORAL SEMAGLUTIDE 14 MG/1
14 TABLET ORAL EVERY MORNING
Refills: 0 | Status: ACTIVE | COMMUNITY

## 2023-09-28 RX ORDER — NYSTATIN 100000 1/G
100000 POWDER TOPICAL
Qty: 1 | Refills: 1 | Status: DISCONTINUED | COMMUNITY
Start: 2022-10-27 | End: 2023-09-28

## 2023-09-28 RX ORDER — MIRABEGRON 25 MG/1
25 TABLET, FILM COATED, EXTENDED RELEASE ORAL EVERY MORNING
Refills: 0 | Status: ACTIVE | COMMUNITY

## 2023-09-28 RX ORDER — SUMATRIPTAN SUCCINATE 25 MG/1
25 TABLET, FILM COATED ORAL
Refills: 0 | Status: ACTIVE | COMMUNITY

## 2023-09-28 RX ORDER — LINACLOTIDE 72 UG/1
72 CAPSULE, GELATIN COATED ORAL DAILY
Refills: 0 | Status: ACTIVE | COMMUNITY

## 2023-09-28 RX ORDER — CHOLECALCIFEROL (VITAMIN D3) 1250 MCG
1.25 MG CAPSULE ORAL
Qty: 8 | Refills: 0 | Status: DISCONTINUED | COMMUNITY
Start: 2023-06-09 | End: 2023-09-28

## 2023-09-28 RX ORDER — FLUVOXAMINE MALEATE 50 MG
50 TABLET ORAL
Refills: 0 | Status: ACTIVE | COMMUNITY

## 2023-09-28 RX ORDER — EMPAGLIFLOZIN 25 MG/1
25 TABLET, FILM COATED ORAL DAILY
Refills: 0 | Status: ACTIVE | COMMUNITY

## 2023-09-28 RX ORDER — CARISOPRODOL 350 MG/1
350 TABLET ORAL TWICE DAILY
Refills: 0 | Status: ACTIVE | COMMUNITY

## 2023-09-28 RX ORDER — VERAPAMIL HYDROCHLORIDE 120 MG/1
120 TABLET ORAL DAILY
Refills: 0 | Status: ACTIVE | COMMUNITY

## 2023-09-28 RX ORDER — RIMEGEPANT SULFATE 75 MG/75MG
75 TABLET, ORALLY DISINTEGRATING ORAL
Qty: 8 | Refills: 0 | Status: DISCONTINUED | COMMUNITY
Start: 2022-11-23 | End: 2023-09-28

## 2023-10-01 PROBLEM — D72.829 ELEVATED WBC COUNT: Status: ACTIVE | Noted: 2021-02-19

## 2023-10-01 PROBLEM — K43.9 VENTRAL HERNIA: Status: ACTIVE | Noted: 2022-10-27

## 2023-10-06 ENCOUNTER — OUTPATIENT (OUTPATIENT)
Dept: OUTPATIENT SERVICES | Facility: HOSPITAL | Age: 38
LOS: 1 days | End: 2023-10-06
Payer: MEDICARE

## 2023-10-06 DIAGNOSIS — Z98.890 OTHER SPECIFIED POSTPROCEDURAL STATES: Chronic | ICD-10-CM

## 2023-10-06 DIAGNOSIS — Z01.818 ENCOUNTER FOR OTHER PREPROCEDURAL EXAMINATION: ICD-10-CM

## 2023-10-06 LAB
APPEARANCE UR: CLEAR — SIGNIFICANT CHANGE UP
BACTERIA # UR AUTO: ABNORMAL /HPF
BILIRUB UR-MCNC: NEGATIVE — SIGNIFICANT CHANGE UP
CAST: 0 /LPF — SIGNIFICANT CHANGE UP (ref 0–4)
COLOR SPEC: YELLOW — SIGNIFICANT CHANGE UP
DIFF PNL FLD: NEGATIVE — SIGNIFICANT CHANGE UP
GLUCOSE UR QL: NEGATIVE MG/DL — SIGNIFICANT CHANGE UP
KETONES UR-MCNC: NEGATIVE MG/DL — SIGNIFICANT CHANGE UP
LEUKOCYTE ESTERASE UR-ACNC: ABNORMAL
MRSA PCR RESULT.: SIGNIFICANT CHANGE UP
NITRITE UR-MCNC: NEGATIVE — SIGNIFICANT CHANGE UP
PH UR: 7.5 — SIGNIFICANT CHANGE UP (ref 5–8)
PROT UR-MCNC: NEGATIVE MG/DL — SIGNIFICANT CHANGE UP
RBC CASTS # UR COMP ASSIST: 1 /HPF — SIGNIFICANT CHANGE UP (ref 0–4)
S AUREUS DNA NOSE QL NAA+PROBE: DETECTED
SP GR SPEC: 1.02 — SIGNIFICANT CHANGE UP (ref 1–1.03)
SQUAMOUS # UR AUTO: 15 /HPF — HIGH (ref 0–5)
UROBILINOGEN FLD QL: 1 MG/DL — SIGNIFICANT CHANGE UP (ref 0.2–1)
WBC UR QL: 3 /HPF — SIGNIFICANT CHANGE UP (ref 0–5)

## 2023-10-06 PROCEDURE — 81001 URINALYSIS AUTO W/SCOPE: CPT

## 2023-10-06 PROCEDURE — 87641 MR-STAPH DNA AMP PROBE: CPT

## 2023-10-06 PROCEDURE — 87640 STAPH A DNA AMP PROBE: CPT

## 2023-10-06 RX ORDER — VERAPAMIL HCL 240 MG
1 CAPSULE, EXTENDED RELEASE PELLETS 24 HR ORAL
Refills: 0 | DISCHARGE

## 2023-10-06 RX ORDER — OFLOXACIN OTIC SOLUTION 3 MG/ML
5 SOLUTION/ DROPS AURICULAR (OTIC)
Refills: 0 | DISCHARGE

## 2023-10-06 RX ORDER — CARISOPRODOL 250 MG
1 TABLET ORAL
Refills: 0 | DISCHARGE

## 2023-10-06 RX ORDER — ALBUTEROL 90 UG/1
2 AEROSOL, METERED ORAL
Refills: 0 | DISCHARGE

## 2023-10-06 RX ORDER — METOPROLOL TARTRATE 50 MG
1 TABLET ORAL
Refills: 0 | DISCHARGE

## 2023-10-06 RX ORDER — TOPIRAMATE 25 MG
1 TABLET ORAL
Refills: 0 | DISCHARGE

## 2023-10-06 RX ORDER — SEMAGLUTIDE 0.68 MG/ML
1 INJECTION, SOLUTION SUBCUTANEOUS
Refills: 0 | DISCHARGE

## 2023-10-06 RX ORDER — BUDESONIDE AND FORMOTEROL FUMARATE DIHYDRATE 160; 4.5 UG/1; UG/1
2 AEROSOL RESPIRATORY (INHALATION)
Refills: 0 | DISCHARGE

## 2023-10-06 RX ORDER — EMPAGLIFLOZIN 10 MG/1
1 TABLET, FILM COATED ORAL
Refills: 0 | DISCHARGE

## 2023-10-06 RX ORDER — MIRABEGRON 50 MG/1
1 TABLET, EXTENDED RELEASE ORAL
Refills: 0 | DISCHARGE

## 2023-10-06 RX ORDER — FAMOTIDINE 10 MG/ML
1 INJECTION INTRAVENOUS
Refills: 0 | DISCHARGE

## 2023-10-06 RX ORDER — ROSUVASTATIN CALCIUM 5 MG/1
1 TABLET ORAL
Refills: 0 | DISCHARGE

## 2023-10-06 RX ORDER — FLUTICASONE PROPIONATE 50 MCG
2 SPRAY, SUSPENSION NASAL
Refills: 0 | DISCHARGE

## 2023-10-06 RX ORDER — FLUTICASONE PROPIONATE AND SALMETEROL 50; 250 UG/1; UG/1
1 POWDER ORAL; RESPIRATORY (INHALATION)
Refills: 0 | DISCHARGE

## 2023-10-06 RX ORDER — LAMOTRIGINE 25 MG/1
1 TABLET, ORALLY DISINTEGRATING ORAL
Refills: 0 | DISCHARGE

## 2023-10-06 RX ORDER — MONTELUKAST 4 MG/1
1 TABLET, CHEWABLE ORAL
Refills: 0 | DISCHARGE

## 2023-10-06 RX ORDER — HYDROCORTISONE 1 %
0 OINTMENT (GRAM) TOPICAL
Refills: 0 | DISCHARGE

## 2023-10-06 RX ORDER — SULFASALAZINE 500 MG
1 TABLET ORAL
Refills: 0 | DISCHARGE

## 2023-10-06 RX ORDER — LINACLOTIDE 145 UG/1
1 CAPSULE, GELATIN COATED ORAL
Refills: 0 | DISCHARGE

## 2023-10-06 RX ORDER — ATOGEPANT 10 MG/1
1 TABLET ORAL
Refills: 0 | DISCHARGE

## 2023-10-06 RX ORDER — HYDROCHLOROTHIAZIDE 25 MG
1 TABLET ORAL
Refills: 0 | DISCHARGE

## 2023-10-06 RX ORDER — FLUVOXAMINE MALEATE 25 MG/1
1 TABLET ORAL
Refills: 0 | DISCHARGE

## 2023-10-06 RX ORDER — LORATADINE 10 MG/1
1 TABLET ORAL
Refills: 0 | DISCHARGE

## 2023-10-06 RX ORDER — SUMATRIPTAN SUCCINATE 4 MG/.5ML
1 INJECTION, SOLUTION SUBCUTANEOUS
Refills: 0 | DISCHARGE

## 2023-10-06 NOTE — ASU PATIENT PROFILE, ADULT - FALL HARM RISK - UNIVERSAL INTERVENTIONS
Bed in lowest position, wheels locked, appropriate side rails in place/Call bell, personal items and telephone in reach/Instruct patient to call for assistance before getting out of bed or chair/Non-slip footwear when patient is out of bed/[place] to call system/Physically safe environment - no spills, clutter or unnecessary equipment/Purposeful Proactive Rounding/Room/bathroom lighting operational, light cord in reach

## 2023-10-06 NOTE — ASU PATIENT PROFILE, ADULT - NSICDXPASTMEDICALHX_GEN_ALL_CORE_FT
PAST MEDICAL HISTORY:  Anxiety     Benign intracranial hypertension     Binge eating     Bipolar I disorder     Chronic back pain     Claustrophobia     COVID-19 vaccine series completed Moderna second dose 4/2021    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)     Depression     Dermatofibroma of female breast     Disc displacement, lumbar     Edema     Environmental and seasonal allergies     Extrinsic asthma     Fibromyalgia     Gastric ulcer     GERD (gastroesophageal reflux disease)     H/O benign neoplasm of bladder     H/O chronic ear infection     Hemorrhoids     History of chronic pain     History of palpitations     HTN (hypertension)     IBS (irritable bowel syndrome) constipation    Macromastia     Migraine headache     Morbid obesity     Morbid obesity     OAB (overactive bladder)     OCD (obsessive compulsive disorder)     Optic disc edema     ALEJO (obstructive sleep apnea) mild pulm consult 9/2021    Ovarian cyst     Panic attack     Post traumatic stress disorder (PTSD)     Pulmonary HTN     Sciatica     Tachycardia     Thoracic disc herniation     Type II diabetes mellitus     UTI (urinary tract infection)     Vaginal yeast infection

## 2023-10-06 NOTE — ASU PATIENT PROFILE, ADULT - ABILITY TO HEAR (WITH HEARING AID OR HEARING APPLIANCE IF NORMALLY USED):
Both ears/Mildly to Moderately Impaired: difficulty hearing in some environments or speaker may need to increase volume or speak distinctly

## 2023-10-06 NOTE — ASU PATIENT PROFILE, ADULT - NSICDXPASTSURGICALHX_GEN_ALL_CORE_FT
PAST SURGICAL HISTORY:  H/O bilateral breast reduction surgery     H/O knee surgery left at age 15    H/O ovarian cystectomy     History of bladder surgery 2015

## 2023-10-07 DIAGNOSIS — Z01.818 ENCOUNTER FOR OTHER PREPROCEDURAL EXAMINATION: ICD-10-CM

## 2023-10-09 NOTE — CHART NOTE - NSCHARTNOTEFT_GEN_A_CORE
10/6/2023-- 37 y.o female scheduled for an Open Ventral Hernia Repair with Mesh  VS: BP- 147/87  P- 103  T- 98.1  SpO2- 100%  Plan  1. Stop all NSAIDS, herbal supplements and vitamins for 7 days. HOLD Jardiance 3 days prior to procedure. Do not take Rybelsus morning of procedure.    2. NPO at midnight.  3. Take the following medications Myrbetriq, Lyrica, Famotidine, Luvox, Buspirone, Toprol, Topamax, Qulipta, Soma, Ativan, Verapamil with small sips of water on the morning of your procedure/surgery. Use inhalers per normal routine  4. Use EZ sponges as directed  5. Use mupirocin as directed  6. Labs (CBC, CMP, A1C ) , EKG on chart done at Dr Finney's office. MRSA swab & U/A done today as per surgeon  7. PMD ELIZABETH Cote, Dr Finney cardiologist and Dr Duncan pulmonary  visit for optimization prior to surgery as per surgeon.  8. Preprocedure education provided
10/9/2023- +staph screen, patient called and result reviewed. Instructed to start Mupirocin ointment on 10/11/2023 , and use as directed, with last dose on 10/15/2023. Patient verbalized understanding

## 2023-10-15 RX ORDER — SODIUM CHLORIDE 9 MG/ML
1000 INJECTION, SOLUTION INTRAVENOUS
Refills: 0 | Status: DISCONTINUED | OUTPATIENT
Start: 2023-10-16 | End: 2023-10-16

## 2023-10-15 RX ORDER — ONDANSETRON 8 MG/1
4 TABLET, FILM COATED ORAL ONCE
Refills: 0 | Status: DISCONTINUED | OUTPATIENT
Start: 2023-10-16 | End: 2023-10-16

## 2023-10-16 ENCOUNTER — TRANSCRIPTION ENCOUNTER (OUTPATIENT)
Age: 38
End: 2023-10-16

## 2023-10-16 ENCOUNTER — OUTPATIENT (OUTPATIENT)
Dept: INPATIENT UNIT | Facility: HOSPITAL | Age: 38
LOS: 1 days | Discharge: HOME | End: 2023-10-16
Payer: MEDICARE

## 2023-10-16 ENCOUNTER — APPOINTMENT (OUTPATIENT)
Dept: SURGERY | Facility: HOSPITAL | Age: 38
End: 2023-10-16

## 2023-10-16 ENCOUNTER — RESULT REVIEW (OUTPATIENT)
Age: 38
End: 2023-10-16

## 2023-10-16 VITALS
HEIGHT: 57 IN | SYSTOLIC BLOOD PRESSURE: 157 MMHG | TEMPERATURE: 99 F | OXYGEN SATURATION: 100 % | WEIGHT: 268.08 LBS | HEART RATE: 100 BPM | RESPIRATION RATE: 15 BRPM | DIASTOLIC BLOOD PRESSURE: 87 MMHG

## 2023-10-16 VITALS
OXYGEN SATURATION: 98 % | HEART RATE: 92 BPM | TEMPERATURE: 99 F | SYSTOLIC BLOOD PRESSURE: 108 MMHG | RESPIRATION RATE: 20 BRPM | DIASTOLIC BLOOD PRESSURE: 69 MMHG

## 2023-10-16 DIAGNOSIS — K43.2 INCISIONAL HERNIA WITHOUT OBSTRUCTION OR GANGRENE: ICD-10-CM

## 2023-10-16 DIAGNOSIS — Z98.890 OTHER SPECIFIED POSTPROCEDURAL STATES: Chronic | ICD-10-CM

## 2023-10-16 PROBLEM — Z87.898 PERSONAL HISTORY OF OTHER SPECIFIED CONDITIONS: Chronic | Status: ACTIVE | Noted: 2023-10-06

## 2023-10-16 PROBLEM — H47.10 UNSPECIFIED PAPILLEDEMA: Chronic | Status: ACTIVE | Noted: 2023-10-06

## 2023-10-16 PROBLEM — M54.30 SCIATICA, UNSPECIFIED SIDE: Chronic | Status: ACTIVE | Noted: 2023-10-06

## 2023-10-16 PROBLEM — F40.240 CLAUSTROPHOBIA: Chronic | Status: ACTIVE | Noted: 2023-10-06

## 2023-10-16 PROBLEM — M54.9 DORSALGIA, UNSPECIFIED: Chronic | Status: ACTIVE | Noted: 2023-10-06

## 2023-10-16 PROBLEM — N32.81 OVERACTIVE BLADDER: Chronic | Status: ACTIVE | Noted: 2023-10-06

## 2023-10-16 PROBLEM — E11.9 TYPE 2 DIABETES MELLITUS WITHOUT COMPLICATIONS: Chronic | Status: ACTIVE | Noted: 2023-10-06

## 2023-10-16 PROBLEM — Z86.69 PERSONAL HISTORY OF OTHER DISEASES OF THE NERVOUS SYSTEM AND SENSE ORGANS: Chronic | Status: ACTIVE | Noted: 2023-10-06

## 2023-10-16 LAB — HCG UR QL: NEGATIVE — SIGNIFICANT CHANGE UP

## 2023-10-16 PROCEDURE — 49593 RPR AA HRN 1ST 3-10 RDC: CPT

## 2023-10-16 PROCEDURE — 81025 URINE PREGNANCY TEST: CPT

## 2023-10-16 PROCEDURE — C1781: CPT

## 2023-10-16 PROCEDURE — 49593 RPR AA HRN 1ST 3-10 RDC: CPT | Mod: AS

## 2023-10-16 PROCEDURE — C9399: CPT

## 2023-10-16 PROCEDURE — 88302 TISSUE EXAM BY PATHOLOGIST: CPT

## 2023-10-16 PROCEDURE — 88302 TISSUE EXAM BY PATHOLOGIST: CPT | Mod: 26

## 2023-10-16 RX ORDER — FAMOTIDINE 10 MG/ML
1 INJECTION INTRAVENOUS
Refills: 0 | DISCHARGE

## 2023-10-16 RX ORDER — LAMOTRIGINE 25 MG/1
1 TABLET, ORALLY DISINTEGRATING ORAL
Refills: 0 | DISCHARGE

## 2023-10-16 RX ORDER — FLUVOXAMINE MALEATE 25 MG/1
1 TABLET ORAL
Refills: 0 | DISCHARGE

## 2023-10-16 RX ORDER — LORATADINE 10 MG/1
1 TABLET ORAL
Refills: 0 | DISCHARGE

## 2023-10-16 RX ORDER — ATOGEPANT 10 MG/1
1 TABLET ORAL
Refills: 0 | DISCHARGE

## 2023-10-16 RX ORDER — BUDESONIDE AND FORMOTEROL FUMARATE DIHYDRATE 160; 4.5 UG/1; UG/1
2 AEROSOL RESPIRATORY (INHALATION)
Refills: 0 | DISCHARGE

## 2023-10-16 RX ORDER — ALBUTEROL 90 UG/1
2 AEROSOL, METERED ORAL
Refills: 0 | DISCHARGE

## 2023-10-16 RX ORDER — MONTELUKAST 4 MG/1
1 TABLET, CHEWABLE ORAL
Refills: 0 | DISCHARGE

## 2023-10-16 RX ORDER — SUMATRIPTAN SUCCINATE 4 MG/.5ML
1 INJECTION, SOLUTION SUBCUTANEOUS
Refills: 0 | DISCHARGE

## 2023-10-16 RX ORDER — MIRABEGRON 50 MG/1
1 TABLET, EXTENDED RELEASE ORAL
Refills: 0 | DISCHARGE

## 2023-10-16 RX ORDER — APREPITANT 80 MG/1
40 CAPSULE ORAL ONCE
Refills: 0 | Status: COMPLETED | OUTPATIENT
Start: 2023-10-16 | End: 2023-10-16

## 2023-10-16 RX ORDER — VERAPAMIL HCL 240 MG
1 CAPSULE, EXTENDED RELEASE PELLETS 24 HR ORAL
Refills: 0 | DISCHARGE

## 2023-10-16 RX ORDER — HYDROCHLOROTHIAZIDE 25 MG
1 TABLET ORAL
Refills: 0 | DISCHARGE

## 2023-10-16 RX ORDER — FLUTICASONE PROPIONATE 50 MCG
2 SPRAY, SUSPENSION NASAL
Refills: 0 | DISCHARGE

## 2023-10-16 RX ORDER — TOPIRAMATE 25 MG
1 TABLET ORAL
Refills: 0 | DISCHARGE

## 2023-10-16 RX ORDER — SULFASALAZINE 500 MG
1 TABLET ORAL
Refills: 0 | DISCHARGE

## 2023-10-16 RX ORDER — ROSUVASTATIN CALCIUM 5 MG/1
1 TABLET ORAL
Refills: 0 | DISCHARGE

## 2023-10-16 RX ORDER — HYDROCORTISONE 1 %
0 OINTMENT (GRAM) TOPICAL
Refills: 0 | DISCHARGE

## 2023-10-16 RX ORDER — OFLOXACIN OTIC SOLUTION 3 MG/ML
5 SOLUTION/ DROPS AURICULAR (OTIC)
Refills: 0 | DISCHARGE

## 2023-10-16 RX ORDER — LINACLOTIDE 145 UG/1
1 CAPSULE, GELATIN COATED ORAL
Refills: 0 | DISCHARGE

## 2023-10-16 RX ORDER — SEMAGLUTIDE 0.68 MG/ML
1 INJECTION, SOLUTION SUBCUTANEOUS
Refills: 0 | DISCHARGE

## 2023-10-16 RX ORDER — METOPROLOL TARTRATE 50 MG
1 TABLET ORAL
Refills: 0 | DISCHARGE

## 2023-10-16 RX ORDER — EMPAGLIFLOZIN 10 MG/1
1 TABLET, FILM COATED ORAL
Refills: 0 | DISCHARGE

## 2023-10-16 RX ORDER — FLUTICASONE PROPIONATE AND SALMETEROL 50; 250 UG/1; UG/1
1 POWDER ORAL; RESPIRATORY (INHALATION)
Refills: 0 | DISCHARGE

## 2023-10-16 RX ORDER — CARISOPRODOL 250 MG
1 TABLET ORAL
Refills: 0 | DISCHARGE

## 2023-10-16 RX ADMIN — APREPITANT 40 MILLIGRAM(S): 80 CAPSULE ORAL at 09:27

## 2023-10-16 NOTE — ASU PATIENT PROFILE, ADULT - FALL HARM RISK - ATTEMPT OOB
Anticoagulation Progress Note    Indication: Lupus Anticoagulant Positive; hx PE  Goal INR: 2.0-3.0  Duration: long term  Order Expiration Date: 11/29/2023  Pertinent history: CHF;COPD (home O2)    Referring Provider: Dr. Garrett  Supervising Provider: Dr. Garrett    Assessment  Yes No   []  [x] Missed doses:   []  [x] Extra doses:   []  [x] Significant medication changes (RX, OTC, Herbal):   [x]  [] High-risk maintenance medications:  ASA, Plavix              [x]  [] Vitamin K / dietary changes (Vitamin K goal: 1-2  cups/week):    []  [x] Bleeding / bruising:   []  [x] Falls / injury:     []  [x] Acute illness:    []  [x] Alcohol intake:   []  [x] Procedures / hospitalization / ER visits: Hospitalized 7-3-2023 to 7-5-2023  for CHF   []  [x] Other: pt hospitalized 5-5-22 to 5-7-22 [facility], dx Brain Mass (suprasellar mass.) Hospitalized 7-3-2023 to 7-5-2023  for CHF      Plan  ( 5 mg tablets)  INR Result: 1.8  The INR result for today is subtherapeutic based on the INR goal 2.0-3.0.  Etiology: unknown  Recommended Dose:  Today take Warfarin 5 mg, Tomorrow resume  5 mg  Wed and 2.5 mg ROW (20 mg/wk)    Follow-Up:   Recommended: 10-14 days  Appointment: 08/31/2023 at 9:30 am   Comments: Patient was instructed to go to urgent care or ED or recent fall and to contact the clinic with any unusual bleeding or bruising, any changes in medications, diet, health status, lifestyle, or any other changes, questions or concerns. Patient verbalized understanding of all discussed.    Counseling  Counseled about signs/symptoms of bruising/bleeding and appropriate management  Counseled about prevention and management of nosebleeds  Counseled about signs/symptoms of thrombosis and/or stroke and when to seek emergent care  Stressed importance of compliance with exact recommended dosage regimen  Stressed importance of compliance with consistent vitamin K intake  Discussed measures to reduce risk for falls and appropriate action when serious  injury occurs  Strongly advised to limit/avoid alcohol consumption  Stressed importance of adherence with recommended lab monitoring  Instructed to notify clinic about medication changes or health status changes  Instructed to notify clinic about scheduled procedures  Discussed risk of thrombosis and/or bleeding with inappropriate anticoagulant use and monitoring frequency    Provided patient/caregiver with written and verbal dosing instructions, patient/caregiver verbalized understanding.   No

## 2023-10-16 NOTE — BRIEF OPERATIVE NOTE - NSICDXBRIEFPROCEDURE_GEN_ALL_CORE_FT
PROCEDURES:  Open repair of incisional hernia of anterior abdominal wall using synthetic mesh and anterior component separation technique 16-Oct-2023 10:45:09  Malcolm Milligan

## 2023-10-16 NOTE — ASU DISCHARGE PLAN (ADULT/PEDIATRIC) - CARE PROVIDER_API CALL
Tavares Swanson  Surgery  [address] [place] [id number]  Phone: [phone]  Fax: [phone]  Established Patient  Follow Up Time: 2 weeks

## 2023-10-16 NOTE — ASU PATIENT PROFILE, ADULT - FALL HARM RISK - UNIVERSAL INTERVENTIONS
Bed in lowest position, wheels locked, appropriate side rails in place/Call bell, personal items and telephone in reach/Instruct patient to call for assistance before getting out of bed or chair/Non-slip footwear when patient is out of bed/[place] to call system/Physically safe environment - no spills, clutter or unnecessary equipment/Purposeful Proactive Rounding/Room/bathroom lighting operational, light cord in reach

## 2023-10-17 PROBLEM — M51.26 OTHER INTERVERTEBRAL DISC DISPLACEMENT, LUMBAR REGION: Chronic | Status: ACTIVE | Noted: 2023-10-06

## 2023-10-17 PROBLEM — F42.9 OBSESSIVE-COMPULSIVE DISORDER, UNSPECIFIED: Chronic | Status: ACTIVE | Noted: 2023-10-06

## 2023-10-17 PROBLEM — M51.24 OTHER INTERVERTEBRAL DISC DISPLACEMENT, THORACIC REGION: Chronic | Status: ACTIVE | Noted: 2023-10-06

## 2023-10-17 PROBLEM — F43.10 POST-TRAUMATIC STRESS DISORDER, UNSPECIFIED: Chronic | Status: ACTIVE | Noted: 2023-10-06

## 2023-10-17 PROBLEM — I27.20 PULMONARY HYPERTENSION, UNSPECIFIED: Chronic | Status: ACTIVE | Noted: 2023-10-06

## 2023-10-17 PROBLEM — K21.9 GASTRO-ESOPHAGEAL REFLUX DISEASE WITHOUT ESOPHAGITIS: Chronic | Status: ACTIVE | Noted: 2023-10-06

## 2023-10-18 ENCOUNTER — APPOINTMENT (OUTPATIENT)
Dept: INTERNAL MEDICINE | Facility: CLINIC | Age: 38
End: 2023-10-18

## 2023-10-20 DIAGNOSIS — K43.2 INCISIONAL HERNIA WITHOUT OBSTRUCTION OR GANGRENE: ICD-10-CM

## 2023-10-20 DIAGNOSIS — F41.9 ANXIETY DISORDER, UNSPECIFIED: ICD-10-CM

## 2023-10-20 DIAGNOSIS — I10 ESSENTIAL (PRIMARY) HYPERTENSION: ICD-10-CM

## 2023-10-20 DIAGNOSIS — K21.9 GASTRO-ESOPHAGEAL REFLUX DISEASE WITHOUT ESOPHAGITIS: ICD-10-CM

## 2023-10-20 DIAGNOSIS — Z88.5 ALLERGY STATUS TO NARCOTIC AGENT: ICD-10-CM

## 2023-10-20 DIAGNOSIS — Z90.49 ACQUIRED ABSENCE OF OTHER SPECIFIED PARTS OF DIGESTIVE TRACT: ICD-10-CM

## 2023-10-20 DIAGNOSIS — G47.33 OBSTRUCTIVE SLEEP APNEA (ADULT) (PEDIATRIC): ICD-10-CM

## 2023-10-20 DIAGNOSIS — E78.5 HYPERLIPIDEMIA, UNSPECIFIED: ICD-10-CM

## 2023-10-20 DIAGNOSIS — J45.909 UNSPECIFIED ASTHMA, UNCOMPLICATED: ICD-10-CM

## 2023-10-20 DIAGNOSIS — E66.9 OBESITY, UNSPECIFIED: ICD-10-CM

## 2023-10-25 LAB
SURGICAL PATHOLOGY STUDY: SIGNIFICANT CHANGE UP
SURGICAL PATHOLOGY STUDY: SIGNIFICANT CHANGE UP

## 2023-10-26 ENCOUNTER — NON-APPOINTMENT (OUTPATIENT)
Age: 38
End: 2023-10-26

## 2023-10-30 ENCOUNTER — APPOINTMENT (OUTPATIENT)
Dept: SURGERY | Facility: CLINIC | Age: 38
End: 2023-10-30
Payer: MEDICARE

## 2023-10-30 PROCEDURE — 99212 OFFICE O/P EST SF 10 MIN: CPT

## 2023-11-17 ENCOUNTER — APPOINTMENT (OUTPATIENT)
Dept: INTERNAL MEDICINE | Facility: CLINIC | Age: 38
End: 2023-11-17

## 2023-11-27 ENCOUNTER — APPOINTMENT (OUTPATIENT)
Dept: OBGYN | Facility: CLINIC | Age: 38
End: 2023-11-27

## 2023-12-08 ENCOUNTER — NON-APPOINTMENT (OUTPATIENT)
Age: 38
End: 2023-12-08

## 2024-01-12 ENCOUNTER — APPOINTMENT (OUTPATIENT)
Dept: INTERNAL MEDICINE | Facility: CLINIC | Age: 39
End: 2024-01-12
Payer: MEDICARE

## 2024-01-12 VITALS
BODY MASS INDEX: 55.44 KG/M2 | OXYGEN SATURATION: 99 % | SYSTOLIC BLOOD PRESSURE: 132 MMHG | HEART RATE: 114 BPM | DIASTOLIC BLOOD PRESSURE: 84 MMHG | WEIGHT: 257 LBS | TEMPERATURE: 98.1 F | HEIGHT: 57 IN

## 2024-01-12 DIAGNOSIS — E55.9 VITAMIN D DEFICIENCY, UNSPECIFIED: ICD-10-CM

## 2024-01-12 DIAGNOSIS — E11.49 TYPE 2 DIABETES MELLITUS WITH OTHER DIABETIC NEUROLOGICAL COMPLICATION: ICD-10-CM

## 2024-01-12 DIAGNOSIS — E66.01 MORBID (SEVERE) OBESITY DUE TO EXCESS CALORIES: ICD-10-CM

## 2024-01-12 DIAGNOSIS — G93.2 BENIGN INTRACRANIAL HYPERTENSION: ICD-10-CM

## 2024-01-12 DIAGNOSIS — K21.9 GASTRO-ESOPHAGEAL REFLUX DISEASE W/OUT ESOPHAGITIS: ICD-10-CM

## 2024-01-12 DIAGNOSIS — Z00.00 ENCOUNTER FOR GENERAL ADULT MEDICAL EXAMINATION W/OUT ABNORMAL FINDINGS: ICD-10-CM

## 2024-01-12 DIAGNOSIS — J45.909 UNSPECIFIED ASTHMA, UNCOMPLICATED: ICD-10-CM

## 2024-01-12 PROCEDURE — G0444 DEPRESSION SCREEN ANNUAL: CPT

## 2024-01-12 PROCEDURE — G0439: CPT

## 2024-01-12 NOTE — PHYSICAL EXAM
[No Acute Distress] : no acute distress [Normal Sclera/Conjunctiva] : normal sclera/conjunctiva [PERRL] : pupils equal round and reactive to light [EOMI] : extraocular movements intact [Normal Oropharynx] : the oropharynx was normal [Normal TMs] : both tympanic membranes were normal [No JVD] : no jugular venous distention [No Lymphadenopathy] : no lymphadenopathy [Supple] : supple [No Respiratory Distress] : no respiratory distress  [No Accessory Muscle Use] : no accessory muscle use [Clear to Auscultation] : lungs were clear to auscultation bilaterally [Normal Rate] : normal rate  [Regular Rhythm] : with a regular rhythm [Normal S1, S2] : normal S1 and S2 [Pedal Pulses Present] : the pedal pulses are present [Soft] : abdomen soft [Non Tender] : non-tender [Normal Bowel Sounds] : normal bowel sounds [Alert and Oriented x3] : oriented to person, place, and time [de-identified] : obese [de-identified] : appears anxious, pressured speech

## 2024-01-12 NOTE — HISTORY OF PRESENT ILLNESS
[FreeTextEntry1] : physical  [de-identified] : Pt is a 38 yr old female with a h/o of anxiety and depression, OCD, panic attacks, HLD, GERD, migraines, intracranial HTN, asthma , DM here for physical.  She currently sees Dr. Arnold SANTANA, Dr. Alem Pichardo GYN, Dr. Brielle Diaz, Dr. Rowley [facility], [facility] Dr. Eber Zhu at  [facility] , [place] cardiology Dr. Finney, Endocrine Dr. Rodrigez.  Pt is s/p Incisional Henria repair with dr. Tavares Jackson on10/16/23.  Pt denies any N/ V, CP, SOB, fever, chills.

## 2024-01-12 NOTE — ASSESSMENT
[FreeTextEntry1] : health maintenance DO FBW- will notify of results, QuantiFERON TB added  Dental exam q 6 months  Yearly DERM for skin check  Yearly eye exam  Stressed important of overall healthy diet and lifestyle to maintain good health and prevent Ilness.  Pt counseled re: recommendations for vaccines , seat belt safety and diet and  exercise and all preventive screens  asthma - stable. Continue with pulmonologist , Advair and Ventolin   Benign Intracranial HTN-  F/ up with [facility]   depression / anxiety/ panic attacks  Continue f/up with psychiatrist and therapist , Continue on Lamictal 100  mg  , Ativan 1 mgPRN,  Prozac 40 mg qd , Luvox 50 mg , Buspirone 30 mg   HTN BP Controlled   Limit sodium 2 gr daily   Continue HCTZ 25 mg po qd, Toprol XL 50 mg po qd  F/up with Dr. Finney , cardiology

## 2024-01-12 NOTE — HEALTH RISK ASSESSMENT
[Yes] : Yes [Monthly or less (1 pt)] : Monthly or less (1 point) [1 or 2 (0 pts)] : 1 or 2 (0 points) [Never (0 pts)] : Never (0 points) [1] : 1) Little interest or pleasure doing things for several days (1) [0] : 2) Feeling down, depressed, or hopeless: Not at all (0) [PHQ-2 Negative - No further assessment needed] : PHQ-2 Negative - No further assessment needed [Patient reported PAP Smear was normal] : Patient reported PAP Smear was normal [Patient reported colonoscopy was normal] : Patient reported colonoscopy was normal [Single] : single [Never] : Never [Audit-CScore] : 1 [[id number]] : 1 [PapSmearDate] : 01/2023 [ColonoscopyDate] : 01/2018 [de-identified] : living with friends

## 2024-01-17 ENCOUNTER — APPOINTMENT (OUTPATIENT)
Dept: INTERNAL MEDICINE | Facility: CLINIC | Age: 39
End: 2024-01-17

## 2024-01-19 ENCOUNTER — TRANSCRIPTION ENCOUNTER (OUTPATIENT)
Age: 39
End: 2024-01-19

## 2024-01-19 ENCOUNTER — APPOINTMENT (OUTPATIENT)
Dept: SURGERY | Facility: CLINIC | Age: 39
End: 2024-01-19
Payer: MEDICARE

## 2024-01-19 DIAGNOSIS — K43.2 INCISIONAL HERNIA W/OUT OBSTRUCTION OR GANGRENE: ICD-10-CM

## 2024-01-19 PROCEDURE — 99213 OFFICE O/P EST LOW 20 MIN: CPT

## 2024-01-19 NOTE — ASSESSMENT
[FreeTextEntry1] : Healing. No recurrence. Firm area may represent fluid post op. Should soften over next 6 months.

## 2024-01-19 NOTE — PHYSICAL EXAM
[JVD] : no jugular venous distention  [Normal Breath Sounds] : Normal breath sounds [Normal Heart Sounds] : normal heart sounds [Abdomen Tenderness] : ~T ~M No abdominal tenderness [Alert] : alert [No HSM] : no hepatosplenomegaly [Oriented to Person] : oriented to person [Oriented to Place] : oriented to place [Oriented to Time] : oriented to time [Calm] : calm [de-identified] : soft, firm area above umbilicus 5 x 5 cm. No d/c. No hernia [de-identified] : moves all 4 extr 5/5

## 2024-01-19 NOTE — PLAN
[FreeTextEntry1] : Observation. F/U as needed. Area should improve with time. Total time > 22 minutes

## 2024-01-26 ENCOUNTER — NON-APPOINTMENT (OUTPATIENT)
Age: 39
End: 2024-01-26

## 2024-01-26 DIAGNOSIS — N39.0 URINARY TRACT INFECTION, SITE NOT SPECIFIED: ICD-10-CM

## 2024-01-26 DIAGNOSIS — D50.9 IRON DEFICIENCY ANEMIA, UNSPECIFIED: ICD-10-CM

## 2024-01-26 RX ORDER — ROSUVASTATIN CALCIUM 40 MG/1
40 TABLET, FILM COATED ORAL DAILY
Qty: 30 | Refills: 3 | Status: ACTIVE | COMMUNITY
Start: 2024-01-26 | End: 1900-01-01

## 2024-01-26 RX ORDER — SULFAMETHOXAZOLE AND TRIMETHOPRIM 800; 160 MG/1; MG/1
800-160 TABLET ORAL TWICE DAILY
Qty: 14 | Refills: 0 | Status: ACTIVE | COMMUNITY
Start: 2024-01-26 | End: 1900-01-01

## 2024-02-26 ENCOUNTER — RX RENEWAL (OUTPATIENT)
Age: 39
End: 2024-02-26

## 2024-02-26 RX ORDER — ATOGEPANT 60 MG/1
60 TABLET ORAL
Qty: 30 | Refills: 0 | Status: ACTIVE | COMMUNITY
Start: 2023-07-21 | End: 1900-01-01

## 2024-03-11 ENCOUNTER — RX RENEWAL (OUTPATIENT)
Age: 39
End: 2024-03-11

## 2024-03-11 RX ORDER — ERGOCALCIFEROL 1.25 MG/1
1.25 MG CAPSULE, LIQUID FILLED ORAL
Qty: 4 | Refills: 0 | Status: ACTIVE | COMMUNITY
Start: 2024-01-26 | End: 1900-01-01

## 2024-03-14 ENCOUNTER — APPOINTMENT (OUTPATIENT)
Dept: INTERNAL MEDICINE | Facility: CLINIC | Age: 39
End: 2024-03-14

## 2024-03-21 ENCOUNTER — APPOINTMENT (OUTPATIENT)
Dept: INTERNAL MEDICINE | Facility: CLINIC | Age: 39
End: 2024-03-21

## 2024-04-01 ENCOUNTER — APPOINTMENT (OUTPATIENT)
Dept: OBGYN | Facility: CLINIC | Age: 39
End: 2024-04-01

## 2024-04-08 ENCOUNTER — NON-APPOINTMENT (OUTPATIENT)
Age: 39
End: 2024-04-08

## 2024-04-30 ENCOUNTER — TRANSCRIPTION ENCOUNTER (OUTPATIENT)
Age: 39
End: 2024-04-30

## 2024-05-10 LAB
25(OH)D3 SERPL-MCNC: 11.3 NG/ML
ALBUMIN SERPL ELPH-MCNC: 4.2 G/DL
ALP BLD-CCNC: 94 U/L
ALT SERPL-CCNC: 24 U/L
ANION GAP SERPL CALC-SCNC: 14 MMOL/L
AST SERPL-CCNC: 13 U/L
BASOPHILS # BLD AUTO: 0.07 K/UL
BASOPHILS NFR BLD AUTO: 0.7 %
BILIRUB SERPL-MCNC: 0.3 MG/DL
BUN SERPL-MCNC: 15 MG/DL
CALCIUM SERPL-MCNC: 10.2 MG/DL
CHLORIDE SERPL-SCNC: 99 MMOL/L
CHOLEST SERPL-MCNC: 225 MG/DL
CO2 SERPL-SCNC: 22 MMOL/L
CREAT SERPL-MCNC: 0.55 MG/DL
EGFR: 120 ML/MIN/1.73M2
EOSINOPHIL # BLD AUTO: 0.1 K/UL
EOSINOPHIL NFR BLD AUTO: 1 %
FERRITIN SERPL-MCNC: 50 NG/ML
GLUCOSE SERPL-MCNC: 188 MG/DL
HCT VFR BLD CALC: 33.9 %
HDLC SERPL-MCNC: 40 MG/DL
HGB BLD-MCNC: 11 G/DL
IMM GRANULOCYTES NFR BLD AUTO: 1.3 %
IRON SATN MFR SERPL: 16 %
IRON SERPL-MCNC: 70 UG/DL
LDLC SERPL CALC-MCNC: 160 MG/DL
LYMPHOCYTES # BLD AUTO: 2.51 K/UL
LYMPHOCYTES NFR BLD AUTO: 25 %
MAN DIFF?: NORMAL
MCHC RBC-ENTMCNC: 27.2 PG
MCHC RBC-ENTMCNC: 32.4 GM/DL
MCV RBC AUTO: 83.7 FL
MONOCYTES # BLD AUTO: 0.5 K/UL
MONOCYTES NFR BLD AUTO: 5 %
NEUTROPHILS # BLD AUTO: 6.74 K/UL
NEUTROPHILS NFR BLD AUTO: 67 %
NONHDLC SERPL-MCNC: 185 MG/DL
PLATELET # BLD AUTO: 248 K/UL
POTASSIUM SERPL-SCNC: 5.2 MMOL/L
PROT SERPL-MCNC: 6.7 G/DL
RBC # BLD: 4.05 M/UL
RBC # FLD: 15.9 %
SODIUM SERPL-SCNC: 135 MMOL/L
T4 FREE SERPL-MCNC: 1 NG/DL
TIBC SERPL-MCNC: 435 UG/DL
TRIGL SERPL-MCNC: 137 MG/DL
TSH SERPL-ACNC: 1.92 UIU/ML
UIBC SERPL-MCNC: 365 UG/DL
WBC # FLD AUTO: 10.05 K/UL

## 2024-05-15 ENCOUNTER — NON-APPOINTMENT (OUTPATIENT)
Age: 39
End: 2024-05-15

## 2024-05-15 LAB
M TB IFN-G BLD-IMP: ABNORMAL
QUANTIFERON TB PLUS MITOGEN MINUS NIL: 0.11 IU/ML
QUANTIFERON TB PLUS NIL: 0.02 IU/ML
QUANTIFERON TB PLUS TB1 MINUS NIL: 0 IU/ML
QUANTIFERON TB PLUS TB2 MINUS NIL: 0 IU/ML

## 2024-05-16 DIAGNOSIS — R61 GENERALIZED HYPERHIDROSIS: ICD-10-CM

## 2024-05-17 ENCOUNTER — APPOINTMENT (OUTPATIENT)
Dept: INTERNAL MEDICINE | Facility: CLINIC | Age: 39
End: 2024-05-17

## 2024-05-17 ENCOUNTER — TRANSCRIPTION ENCOUNTER (OUTPATIENT)
Age: 39
End: 2024-05-17

## 2024-06-28 ENCOUNTER — APPOINTMENT (OUTPATIENT)
Dept: INTERNAL MEDICINE | Facility: CLINIC | Age: 39
End: 2024-06-28

## 2024-07-05 ENCOUNTER — APPOINTMENT (OUTPATIENT)
Dept: INTERNAL MEDICINE | Facility: CLINIC | Age: 39
End: 2024-07-05

## 2024-07-18 ENCOUNTER — APPOINTMENT (OUTPATIENT)
Dept: INTERNAL MEDICINE | Facility: CLINIC | Age: 39
End: 2024-07-18
Payer: MEDICARE

## 2024-07-18 DIAGNOSIS — R61 GENERALIZED HYPERHIDROSIS: ICD-10-CM

## 2024-07-18 DIAGNOSIS — F42.9 OBSESSIVE-COMPULSIVE DISORDER, UNSPECIFIED: ICD-10-CM

## 2024-07-18 DIAGNOSIS — F32.2 MAJOR DEPRESSIVE DISORDER, SINGLE EPISODE, SEVERE W/OUT PSYCHOTIC FEATURES: ICD-10-CM

## 2024-07-18 DIAGNOSIS — G47.33 OBSTRUCTIVE SLEEP APNEA (ADULT) (PEDIATRIC): ICD-10-CM

## 2024-07-18 DIAGNOSIS — E66.01 MORBID (SEVERE) OBESITY DUE TO EXCESS CALORIES: ICD-10-CM

## 2024-07-18 DIAGNOSIS — D50.9 IRON DEFICIENCY ANEMIA, UNSPECIFIED: ICD-10-CM

## 2024-07-18 PROCEDURE — G2211 COMPLEX E/M VISIT ADD ON: CPT

## 2024-07-18 PROCEDURE — 99214 OFFICE O/P EST MOD 30 MIN: CPT

## 2024-07-18 RX ORDER — FUROSEMIDE 20 MG/1
20 TABLET ORAL
Refills: 0 | Status: ACTIVE | COMMUNITY

## 2024-07-18 RX ORDER — ONDANSETRON HYDROCHLORIDE 4 MG/1
4 TABLET, FILM COATED ORAL
Refills: 0 | Status: ACTIVE | COMMUNITY

## 2024-07-18 RX ORDER — TIZANIDINE HYDROCHLORIDE 4 MG/1
4 CAPSULE ORAL
Refills: 0 | Status: ACTIVE | COMMUNITY

## 2024-07-18 RX ORDER — ASENAPINE 2.5 MG/1
2.5 TABLET SUBLINGUAL
Refills: 0 | Status: ACTIVE | COMMUNITY

## 2024-07-18 RX ORDER — ESOMEPRAZOLE MAGNESIUM 20 MG/1
TABLET ORAL
Refills: 0 | Status: ACTIVE | COMMUNITY

## 2024-07-18 RX ORDER — MONTELUKAST SODIUM 10 MG/1
10 TABLET, FILM COATED ORAL
Refills: 0 | Status: ACTIVE | COMMUNITY

## 2024-07-18 RX ORDER — LURASIDONE HYDROCHLORIDE 40 MG/1
40 TABLET, FILM COATED ORAL
Refills: 0 | Status: ACTIVE | COMMUNITY

## 2024-07-19 ENCOUNTER — TRANSCRIPTION ENCOUNTER (OUTPATIENT)
Age: 39
End: 2024-07-19

## 2024-07-28 LAB
M TB IFN-G BLD-IMP: NEGATIVE
QUANTIFERON TB PLUS MITOGEN MINUS NIL: >10 IU/ML
QUANTIFERON TB PLUS NIL: 0.04 IU/ML
QUANTIFERON TB PLUS TB1 MINUS NIL: 0.01 IU/ML
QUANTIFERON TB PLUS TB2 MINUS NIL: 0.02 IU/ML

## 2024-07-31 ENCOUNTER — NON-APPOINTMENT (OUTPATIENT)
Age: 39
End: 2024-07-31

## 2024-08-09 ENCOUNTER — APPOINTMENT (OUTPATIENT)
Dept: INTERNAL MEDICINE | Facility: CLINIC | Age: 39
End: 2024-08-09

## 2024-08-12 ENCOUNTER — APPOINTMENT (OUTPATIENT)
Dept: FAMILY MEDICINE | Facility: CLINIC | Age: 39
End: 2024-08-12

## 2024-08-19 ENCOUNTER — NON-APPOINTMENT (OUTPATIENT)
Age: 39
End: 2024-08-19

## 2024-08-19 ENCOUNTER — APPOINTMENT (OUTPATIENT)
Dept: INTERNAL MEDICINE | Facility: CLINIC | Age: 39
End: 2024-08-19
Payer: MEDICARE

## 2024-08-19 VITALS
HEIGHT: 57 IN | HEART RATE: 97 BPM | OXYGEN SATURATION: 98 % | BODY MASS INDEX: 56.55 KG/M2 | TEMPERATURE: 98.5 F | WEIGHT: 262.13 LBS

## 2024-08-19 DIAGNOSIS — R94.31 ABNORMAL ELECTROCARDIOGRAM [ECG] [EKG]: ICD-10-CM

## 2024-08-19 DIAGNOSIS — Z01.818 ENCOUNTER FOR OTHER PREPROCEDURAL EXAMINATION: ICD-10-CM

## 2024-08-19 PROCEDURE — 93000 ELECTROCARDIOGRAM COMPLETE: CPT

## 2024-08-19 PROCEDURE — 99214 OFFICE O/P EST MOD 30 MIN: CPT

## 2024-08-20 ENCOUNTER — APPOINTMENT (OUTPATIENT)
Dept: INTERNAL MEDICINE | Facility: CLINIC | Age: 39
End: 2024-08-20

## 2024-08-20 PROBLEM — Z01.818 PRE-OP EXAM: Status: ACTIVE | Noted: 2021-01-02

## 2024-08-20 PROBLEM — R94.31 ABNORMAL EKG: Status: ACTIVE | Noted: 2024-08-20

## 2024-08-23 LAB
ALBUMIN SERPL ELPH-MCNC: 4.1 G/DL
ALP BLD-CCNC: 100 U/L
ALT SERPL-CCNC: 20 U/L
ANION GAP SERPL CALC-SCNC: 13 MMOL/L
AST SERPL-CCNC: 12 U/L
BASOPHILS # BLD AUTO: 0.08 K/UL
BASOPHILS NFR BLD AUTO: 0.7 %
BILIRUB SERPL-MCNC: 0.2 MG/DL
BUN SERPL-MCNC: 11 MG/DL
CALCIUM SERPL-MCNC: 9.8 MG/DL
CHLORIDE SERPL-SCNC: 101 MMOL/L
CO2 SERPL-SCNC: 22 MMOL/L
CREAT SERPL-MCNC: 0.49 MG/DL
EGFR: 123 ML/MIN/1.73M2
EOSINOPHIL # BLD AUTO: 0.09 K/UL
EOSINOPHIL NFR BLD AUTO: 0.8 %
GLUCOSE SERPL-MCNC: 281 MG/DL
HCT VFR BLD CALC: 36.4 %
HGB BLD-MCNC: 11.5 G/DL
IMM GRANULOCYTES NFR BLD AUTO: 0.6 %
LYMPHOCYTES # BLD AUTO: 2.38 K/UL
LYMPHOCYTES NFR BLD AUTO: 20 %
MAN DIFF?: NORMAL
MCHC RBC-ENTMCNC: 27.6 PG
MCHC RBC-ENTMCNC: 31.6 GM/DL
MCV RBC AUTO: 87.3 FL
MONOCYTES # BLD AUTO: 0.57 K/UL
MONOCYTES NFR BLD AUTO: 4.8 %
NEUTROPHILS # BLD AUTO: 8.7 K/UL
NEUTROPHILS NFR BLD AUTO: 73.1 %
PLATELET # BLD AUTO: 251 K/UL
POTASSIUM SERPL-SCNC: 4.2 MMOL/L
PROT SERPL-MCNC: 6.6 G/DL
RBC # BLD: 4.17 M/UL
RBC # FLD: 15 %
SODIUM SERPL-SCNC: 136 MMOL/L
WBC # FLD AUTO: 11.89 K/UL

## 2024-08-23 NOTE — HISTORY OF PRESENT ILLNESS
[(Patient denies any chest pain, claudication, dyspnea on exertion, orthopnea, palpitations or syncope)] : Patient denies any chest pain, claudication, dyspnea on exertion, orthopnea, palpitations or syncope [Moderate (4-6 METs)] : Moderate (4-6 METs) [FreeTextEntry1] : MRI knee under anesthesia [FreeTextEntry2] : 8/27/24 [FreeTextEntry3] : [facility] [FreeTextEntry4] : This is a 38-year-old female past medical history of obstructive sleep apnea morbid obesity hyperlipidemia GERD asthma depression, breast reduction, here for preop clearance, upcoming MRI of her knee, on 27 August.  She brings a note from the hospital where she is having the MRI done she needs a CBC a CMP and a new EKG She denies any new complaints today Follows with Dr. Cee office, last seen 2 months ago [FreeTextEntry5] : leoncio

## 2024-08-23 NOTE — HISTORY OF PRESENT ILLNESS
[(Patient denies any chest pain, claudication, dyspnea on exertion, orthopnea, palpitations or syncope)] : Patient denies any chest pain, claudication, dyspnea on exertion, orthopnea, palpitations or syncope [Moderate (4-6 METs)] : Moderate (4-6 METs) [FreeTextEntry1] : MRI knee under anesthesia [FreeTextEntry2] : 8/27/24 [FreeTextEntry3] : [facility] [FreeTextEntry4] : This is a 38-year-old female past medical history of obstructive sleep apnea morbid obesity hyperlipidemia GERD asthma depression, breast reduction, here for preop clearance, upcoming MRI of her knee, on 27 August.  She brings a note from the hospital where she is having the MRI done she needs a CBC a CMP and a new EKG She denies any new complaints today Follows with Dr. Cee office, last seen 2 months ago [FreeTextEntry5] : leoncio

## 2024-08-23 NOTE — PLAN
[FreeTextEntry1] : To hold her Rybelsus 1 week prior to the surgery Ekg reviewed by cardiology and patient cleared for procedure CBC and CMP at baseline no contraindication for upcoming testing with anesthesia

## 2024-08-23 NOTE — REVIEW OF SYSTEMS
[Joint Pain] : joint pain [Fever] : no fever [Fatigue] : no fatigue [Vision Problems] : no vision problems [Earache] : no earache [Chest Pain] : no chest pain [Shortness Of Breath] : no shortness of breath [Abdominal Pain] : no abdominal pain [Dysuria] : no dysuria [Headache] : no headache [Suicidal] : not suicidal

## 2024-08-23 NOTE — PHYSICAL EXAM
[No Acute Distress] : no acute distress [Normal Sclera/Conjunctiva] : normal sclera/conjunctiva [Clear to Auscultation] : lungs were clear to auscultation bilaterally [Normal Rate] : normal rate  [Regular Rhythm] : with a regular rhythm [Soft] : abdomen soft [No CVA Tenderness] : no CVA  tenderness [No Rash] : no rash [Alert and Oriented x3] : oriented to person, place, and time [de-identified] : Gait compromised because of her chronic medical issues

## 2024-08-23 NOTE — ASSESSMENT
[Patient NOT optimized for Surgery at this time] : Patient not optimized for surgery at this time [FreeTextEntry4] : I have advised the patient to avoid aspirin and anti inflammatories and all vitamins and supplements for one week prior to surgery. No CP, SOB  Mets>4 Ekg with no significant ST segment changes RCRI: Class II Medically Stable, may proceed with proposed procedure.w/u further work up. Risks and benefit of procedure discussed with patient. Recent labs reviewed and discussed with patient.

## 2024-08-23 NOTE — PHYSICAL EXAM
[No Acute Distress] : no acute distress [Normal Sclera/Conjunctiva] : normal sclera/conjunctiva [Clear to Auscultation] : lungs were clear to auscultation bilaterally [Normal Rate] : normal rate  [Regular Rhythm] : with a regular rhythm [Soft] : abdomen soft [No CVA Tenderness] : no CVA  tenderness [No Rash] : no rash [Alert and Oriented x3] : oriented to person, place, and time [de-identified] : Gait compromised because of her chronic medical issues

## 2024-09-09 ENCOUNTER — APPOINTMENT (OUTPATIENT)
Dept: OBGYN | Facility: CLINIC | Age: 39
End: 2024-09-09

## 2024-09-09 ENCOUNTER — NON-APPOINTMENT (OUTPATIENT)
Age: 39
End: 2024-09-09

## 2024-09-12 ENCOUNTER — NON-APPOINTMENT (OUTPATIENT)
Age: 39
End: 2024-09-12

## 2024-09-24 ENCOUNTER — TRANSCRIPTION ENCOUNTER (OUTPATIENT)
Age: 39
End: 2024-09-24

## 2024-09-24 ENCOUNTER — NON-APPOINTMENT (OUTPATIENT)
Age: 39
End: 2024-09-24

## 2024-09-27 ENCOUNTER — APPOINTMENT (OUTPATIENT)
Dept: SURGERY | Facility: CLINIC | Age: 39
End: 2024-09-27

## 2024-09-30 ENCOUNTER — APPOINTMENT (OUTPATIENT)
Dept: INTERNAL MEDICINE | Facility: CLINIC | Age: 39
End: 2024-09-30

## 2024-11-18 LAB — 25(OH)D3 SERPL-MCNC: 13.4 NG/ML

## 2024-11-20 RX ORDER — ERGOCALCIFEROL 1.25 MG/1
1.25 MG CAPSULE ORAL
Qty: 8 | Refills: 0 | Status: ACTIVE | COMMUNITY
Start: 2024-11-20 | End: 1900-01-01

## 2024-11-26 ENCOUNTER — APPOINTMENT (OUTPATIENT)
Dept: INTERNAL MEDICINE | Facility: CLINIC | Age: 39
End: 2024-11-26
Payer: MEDICARE

## 2024-11-26 VITALS
TEMPERATURE: 97.9 F | OXYGEN SATURATION: 97 % | WEIGHT: 260 LBS | BODY MASS INDEX: 56.09 KG/M2 | HEART RATE: 108 BPM | DIASTOLIC BLOOD PRESSURE: 70 MMHG | SYSTOLIC BLOOD PRESSURE: 128 MMHG | HEIGHT: 57 IN

## 2024-11-26 DIAGNOSIS — G93.2 BENIGN INTRACRANIAL HYPERTENSION: ICD-10-CM

## 2024-11-26 DIAGNOSIS — N64.89 OTHER SPECIFIED DISORDERS OF BREAST: ICD-10-CM

## 2024-11-26 DIAGNOSIS — E55.9 VITAMIN D DEFICIENCY, UNSPECIFIED: ICD-10-CM

## 2024-11-26 DIAGNOSIS — G47.10 HYPERSOMNIA, UNSPECIFIED: ICD-10-CM

## 2024-11-26 DIAGNOSIS — K21.9 GASTRO-ESOPHAGEAL REFLUX DISEASE W/OUT ESOPHAGITIS: ICD-10-CM

## 2024-11-26 DIAGNOSIS — G47.33 OBSTRUCTIVE SLEEP APNEA (ADULT) (PEDIATRIC): ICD-10-CM

## 2024-11-26 DIAGNOSIS — E66.01 MORBID (SEVERE) OBESITY DUE TO EXCESS CALORIES: ICD-10-CM

## 2024-11-26 DIAGNOSIS — F32.2 MAJOR DEPRESSIVE DISORDER, SINGLE EPISODE, SEVERE W/OUT PSYCHOTIC FEATURES: ICD-10-CM

## 2024-11-26 PROCEDURE — 99214 OFFICE O/P EST MOD 30 MIN: CPT

## 2024-11-26 PROCEDURE — G2211 COMPLEX E/M VISIT ADD ON: CPT

## 2024-11-27 LAB — HBA1C MFR BLD HPLC: 7.8

## 2025-01-01 ENCOUNTER — NON-APPOINTMENT (OUTPATIENT)
Age: 40
End: 2025-01-01

## 2025-01-20 ENCOUNTER — NON-APPOINTMENT (OUTPATIENT)
Age: 40
End: 2025-01-20

## 2025-01-22 ENCOUNTER — APPOINTMENT (OUTPATIENT)
Dept: SURGERY | Facility: CLINIC | Age: 40
End: 2025-01-22

## 2025-01-24 ENCOUNTER — APPOINTMENT (OUTPATIENT)
Dept: INTERNAL MEDICINE | Facility: CLINIC | Age: 40
End: 2025-01-24

## 2025-02-11 ENCOUNTER — TRANSCRIPTION ENCOUNTER (OUTPATIENT)
Age: 40
End: 2025-02-11

## 2025-02-11 RX ORDER — CHOLECALCIFEROL (VITAMIN D3) 1250 MCG
1.25 MG CAPSULE ORAL
Qty: 12 | Refills: 0 | Status: ACTIVE | COMMUNITY
Start: 2025-02-11 | End: 1900-01-01

## 2025-03-13 ENCOUNTER — TRANSCRIPTION ENCOUNTER (OUTPATIENT)
Age: 40
End: 2025-03-13

## 2025-03-25 ENCOUNTER — TRANSCRIPTION ENCOUNTER (OUTPATIENT)
Age: 40
End: 2025-03-25

## 2025-03-28 ENCOUNTER — APPOINTMENT (OUTPATIENT)
Dept: INTERNAL MEDICINE | Facility: CLINIC | Age: 40
End: 2025-03-28
Payer: MEDICARE

## 2025-03-28 VITALS
BODY MASS INDEX: 54.8 KG/M2 | WEIGHT: 254 LBS | HEIGHT: 57 IN | TEMPERATURE: 97.8 F | SYSTOLIC BLOOD PRESSURE: 130 MMHG | HEART RATE: 91 BPM | DIASTOLIC BLOOD PRESSURE: 84 MMHG | OXYGEN SATURATION: 99 %

## 2025-03-28 DIAGNOSIS — G47.10 HYPERSOMNIA, UNSPECIFIED: ICD-10-CM

## 2025-03-28 DIAGNOSIS — F32.2 MAJOR DEPRESSIVE DISORDER, SINGLE EPISODE, SEVERE W/OUT PSYCHOTIC FEATURES: ICD-10-CM

## 2025-03-28 DIAGNOSIS — E66.01 MORBID (SEVERE) OBESITY DUE TO EXCESS CALORIES: ICD-10-CM

## 2025-03-28 DIAGNOSIS — E55.9 VITAMIN D DEFICIENCY, UNSPECIFIED: ICD-10-CM

## 2025-03-28 DIAGNOSIS — E11.49 TYPE 2 DIABETES MELLITUS WITH OTHER DIABETIC NEUROLOGICAL COMPLICATION: ICD-10-CM

## 2025-03-28 DIAGNOSIS — E78.2 MIXED HYPERLIPIDEMIA: ICD-10-CM

## 2025-03-28 PROCEDURE — G2211 COMPLEX E/M VISIT ADD ON: CPT

## 2025-03-28 PROCEDURE — 99214 OFFICE O/P EST MOD 30 MIN: CPT

## 2025-03-28 RX ORDER — CHOLECALCIFEROL (VITAMIN D3) 1250 MCG
1.25 MG CAPSULE ORAL
Qty: 8 | Refills: 0 | Status: ACTIVE | COMMUNITY
Start: 2025-03-28 | End: 1900-01-01

## 2025-04-29 ENCOUNTER — APPOINTMENT (OUTPATIENT)
Dept: NEUROLOGY | Facility: CLINIC | Age: 40
End: 2025-04-29
Payer: MEDICARE

## 2025-04-29 VITALS
BODY MASS INDEX: 53.72 KG/M2 | WEIGHT: 249 LBS | TEMPERATURE: 98.2 F | SYSTOLIC BLOOD PRESSURE: 123 MMHG | DIASTOLIC BLOOD PRESSURE: 84 MMHG | HEART RATE: 92 BPM | HEIGHT: 57 IN

## 2025-04-29 DIAGNOSIS — R11.14 BILIOUS VOMITING: ICD-10-CM

## 2025-04-29 PROCEDURE — 99214 OFFICE O/P EST MOD 30 MIN: CPT

## 2025-04-29 RX ORDER — SUMATRIPTAN 50 MG/1
50 TABLET, FILM COATED ORAL
Qty: 12 | Refills: 5 | Status: ACTIVE | COMMUNITY
Start: 2025-04-29 | End: 1900-01-01

## 2025-04-29 RX ORDER — TIRZEPATIDE 7.5 MG/.5ML
7.5 INJECTION, SOLUTION SUBCUTANEOUS
Refills: 0 | Status: ACTIVE | COMMUNITY

## 2025-05-09 RX ORDER — UBROGEPANT 100 MG/1
100 TABLET ORAL
Qty: 16 | Refills: 5 | Status: ACTIVE | COMMUNITY
Start: 2025-04-29

## 2025-05-09 RX ORDER — ATOGEPANT 60 MG/1
60 TABLET ORAL
Qty: 30 | Refills: 5 | Status: ACTIVE | COMMUNITY
Start: 2025-04-29

## 2025-05-27 ENCOUNTER — APPOINTMENT (OUTPATIENT)
Dept: OBGYN | Facility: CLINIC | Age: 40
End: 2025-05-27

## 2025-09-12 ENCOUNTER — APPOINTMENT (OUTPATIENT)
Dept: OBGYN | Facility: CLINIC | Age: 40
End: 2025-09-12